# Patient Record
Sex: FEMALE | Race: WHITE | NOT HISPANIC OR LATINO
[De-identification: names, ages, dates, MRNs, and addresses within clinical notes are randomized per-mention and may not be internally consistent; named-entity substitution may affect disease eponyms.]

---

## 2017-01-30 ENCOUNTER — APPOINTMENT (OUTPATIENT)
Dept: PAIN MANAGEMENT | Facility: CLINIC | Age: 82
End: 2017-01-30

## 2017-01-30 VITALS
SYSTOLIC BLOOD PRESSURE: 163 MMHG | DIASTOLIC BLOOD PRESSURE: 83 MMHG | HEIGHT: 58 IN | BODY MASS INDEX: 24.56 KG/M2 | WEIGHT: 117 LBS | HEART RATE: 70 BPM

## 2017-02-22 ENCOUNTER — MESSAGE (OUTPATIENT)
Age: 82
End: 2017-02-22

## 2017-03-10 ENCOUNTER — MESSAGE (OUTPATIENT)
Age: 82
End: 2017-03-10

## 2017-03-15 ENCOUNTER — MESSAGE (OUTPATIENT)
Age: 82
End: 2017-03-15

## 2017-03-30 ENCOUNTER — APPOINTMENT (OUTPATIENT)
Dept: PAIN MANAGEMENT | Facility: CLINIC | Age: 82
End: 2017-03-30

## 2017-03-30 VITALS
BODY MASS INDEX: 24.56 KG/M2 | HEIGHT: 58 IN | WEIGHT: 117 LBS | DIASTOLIC BLOOD PRESSURE: 75 MMHG | HEART RATE: 90 BPM | SYSTOLIC BLOOD PRESSURE: 148 MMHG

## 2017-03-30 RX ORDER — METHYLPREDNISOLONE 4 MG/1
4 TABLET ORAL
Qty: 1 | Refills: 0 | Status: DISCONTINUED | COMMUNITY
Start: 2017-02-22 | End: 2017-03-30

## 2017-03-30 RX ORDER — DICLOFENAC SODIUM AND MISOPROSTOL 75; 200 MG/1; UG/1
75-0.2 TABLET, DELAYED RELEASE ORAL
Qty: 60 | Refills: 0 | Status: DISCONTINUED | COMMUNITY
Start: 2017-01-30 | End: 2017-03-30

## 2017-03-30 RX ORDER — NORTRIPTYLINE HYDROCHLORIDE 25 MG/1
25 CAPSULE ORAL
Qty: 60 | Refills: 0 | Status: DISCONTINUED | COMMUNITY
Start: 2017-01-30 | End: 2017-03-30

## 2017-04-02 ENCOUNTER — FORM ENCOUNTER (OUTPATIENT)
Age: 82
End: 2017-04-02

## 2017-04-03 ENCOUNTER — OUTPATIENT (OUTPATIENT)
Dept: OUTPATIENT SERVICES | Facility: HOSPITAL | Age: 82
LOS: 1 days | End: 2017-04-03
Payer: MEDICARE

## 2017-04-03 ENCOUNTER — APPOINTMENT (OUTPATIENT)
Dept: MRI IMAGING | Facility: CLINIC | Age: 82
End: 2017-04-03

## 2017-04-03 DIAGNOSIS — R51 HEADACHE: ICD-10-CM

## 2017-04-03 DIAGNOSIS — G44.85 PRIMARY STABBING HEADACHE: ICD-10-CM

## 2017-04-03 DIAGNOSIS — M50.90 CERVICAL DISC DISORDER, UNSPECIFIED, UNSPECIFIED CERVICAL REGION: ICD-10-CM

## 2017-04-03 PROCEDURE — 70551 MRI BRAIN STEM W/O DYE: CPT

## 2017-04-14 ENCOUNTER — MESSAGE (OUTPATIENT)
Age: 82
End: 2017-04-14

## 2017-05-22 ENCOUNTER — MESSAGE (OUTPATIENT)
Age: 82
End: 2017-05-22

## 2017-05-22 ENCOUNTER — FORM ENCOUNTER (OUTPATIENT)
Age: 82
End: 2017-05-22

## 2017-05-22 RX ORDER — BACLOFEN 10 MG/1
10 TABLET ORAL TWICE DAILY
Qty: 60 | Refills: 3 | Status: DISCONTINUED | COMMUNITY
Start: 2017-04-14 | End: 2017-05-22

## 2017-05-23 ENCOUNTER — OUTPATIENT (OUTPATIENT)
Dept: OUTPATIENT SERVICES | Facility: HOSPITAL | Age: 82
LOS: 1 days | End: 2017-05-23
Payer: MEDICARE

## 2017-05-23 ENCOUNTER — APPOINTMENT (OUTPATIENT)
Dept: MRI IMAGING | Facility: CLINIC | Age: 82
End: 2017-05-23

## 2017-05-23 DIAGNOSIS — M50.90 CERVICAL DISC DISORDER, UNSPECIFIED, UNSPECIFIED CERVICAL REGION: ICD-10-CM

## 2017-05-23 DIAGNOSIS — M62.838 OTHER MUSCLE SPASM: ICD-10-CM

## 2017-05-23 DIAGNOSIS — R51 HEADACHE: ICD-10-CM

## 2017-05-23 PROCEDURE — 72141 MRI NECK SPINE W/O DYE: CPT

## 2017-06-12 ENCOUNTER — MEDICATION RENEWAL (OUTPATIENT)
Age: 82
End: 2017-06-12

## 2017-06-21 ENCOUNTER — APPOINTMENT (OUTPATIENT)
Dept: NEUROSURGERY | Facility: CLINIC | Age: 82
End: 2017-06-21

## 2017-06-21 VITALS
HEART RATE: 66 BPM | WEIGHT: 118 LBS | DIASTOLIC BLOOD PRESSURE: 73 MMHG | SYSTOLIC BLOOD PRESSURE: 122 MMHG | BODY MASS INDEX: 24.77 KG/M2 | HEIGHT: 58 IN

## 2017-06-21 RX ORDER — INDOMETHACIN 50 MG/1
50 CAPSULE ORAL
Qty: 45 | Refills: 3 | Status: DISCONTINUED | COMMUNITY
Start: 2017-03-30 | End: 2017-06-21

## 2017-07-03 ENCOUNTER — APPOINTMENT (OUTPATIENT)
Dept: NEUROSURGERY | Facility: CLINIC | Age: 82
End: 2017-07-03

## 2017-07-03 ENCOUNTER — OUTPATIENT (OUTPATIENT)
Dept: OUTPATIENT SERVICES | Facility: HOSPITAL | Age: 82
LOS: 1 days | End: 2017-07-03
Payer: MEDICARE

## 2017-07-03 DIAGNOSIS — M54.12 RADICULOPATHY, CERVICAL REGION: ICD-10-CM

## 2017-07-03 PROCEDURE — 62321 NJX INTERLAMINAR CRV/THRC: CPT

## 2017-07-04 ENCOUNTER — TRANSCRIPTION ENCOUNTER (OUTPATIENT)
Age: 82
End: 2017-07-04

## 2017-07-18 ENCOUNTER — APPOINTMENT (OUTPATIENT)
Dept: NEUROSURGERY | Facility: CLINIC | Age: 82
End: 2017-07-18

## 2017-07-18 DIAGNOSIS — M62.838 OTHER MUSCLE SPASM: ICD-10-CM

## 2017-07-24 ENCOUNTER — MESSAGE (OUTPATIENT)
Age: 82
End: 2017-07-24

## 2017-08-17 ENCOUNTER — APPOINTMENT (OUTPATIENT)
Dept: PAIN MANAGEMENT | Facility: CLINIC | Age: 82
End: 2017-08-17

## 2017-10-17 ENCOUNTER — MESSAGE (OUTPATIENT)
Age: 82
End: 2017-10-17

## 2017-10-30 ENCOUNTER — APPOINTMENT (OUTPATIENT)
Dept: DERMATOLOGY | Facility: CLINIC | Age: 82
End: 2017-10-30

## 2018-01-17 ENCOUNTER — APPOINTMENT (OUTPATIENT)
Dept: PAIN MANAGEMENT | Facility: CLINIC | Age: 83
End: 2018-01-17
Payer: MEDICARE

## 2018-01-17 VITALS
HEART RATE: 77 BPM | DIASTOLIC BLOOD PRESSURE: 81 MMHG | WEIGHT: 124 LBS | BODY MASS INDEX: 26.03 KG/M2 | SYSTOLIC BLOOD PRESSURE: 144 MMHG | HEIGHT: 58 IN

## 2018-01-17 DIAGNOSIS — G44.85 PRIMARY STABBING HEADACHE: ICD-10-CM

## 2018-01-17 DIAGNOSIS — M54.2 CERVICALGIA: ICD-10-CM

## 2018-01-17 DIAGNOSIS — R51 HEADACHE: ICD-10-CM

## 2018-01-17 DIAGNOSIS — M50.90 CERVICAL DISC DISORDER, UNSPECIFIED, UNSPECIFIED CERVICAL REGION: ICD-10-CM

## 2018-01-17 PROCEDURE — 99214 OFFICE O/P EST MOD 30 MIN: CPT

## 2018-01-17 RX ORDER — MELOXICAM 15 MG/1
15 TABLET ORAL
Qty: 30 | Refills: 2 | Status: DISCONTINUED | COMMUNITY
Start: 2017-07-24 | End: 2018-01-17

## 2018-01-17 RX ORDER — DEXAMETHASONE 4 MG/1
4 TABLET ORAL
Qty: 20 | Refills: 0 | Status: DISCONTINUED | COMMUNITY
Start: 2017-10-17 | End: 2018-01-17

## 2019-01-14 ENCOUNTER — APPOINTMENT (OUTPATIENT)
Dept: NEUROLOGY | Facility: CLINIC | Age: 84
End: 2019-01-14

## 2019-03-18 ENCOUNTER — APPOINTMENT (OUTPATIENT)
Dept: NEUROLOGY | Facility: CLINIC | Age: 84
End: 2019-03-18
Payer: MEDICARE

## 2019-03-18 VITALS
DIASTOLIC BLOOD PRESSURE: 71 MMHG | SYSTOLIC BLOOD PRESSURE: 134 MMHG | HEART RATE: 72 BPM | BODY MASS INDEX: 24.54 KG/M2 | HEIGHT: 60 IN | WEIGHT: 125 LBS

## 2019-03-18 DIAGNOSIS — M79.2 NEURALGIA AND NEURITIS, UNSPECIFIED: ICD-10-CM

## 2019-03-18 DIAGNOSIS — K58.9 IRRITABLE BOWEL SYNDROME W/OUT DIARRHEA: ICD-10-CM

## 2019-03-18 PROCEDURE — 96116 NUBHVL XM PHYS/QHP 1ST HR: CPT | Mod: 59

## 2019-03-18 PROCEDURE — 99215 OFFICE O/P EST HI 40 MIN: CPT

## 2019-03-18 NOTE — HISTORY OF PRESENT ILLNESS
[FreeTextEntry1] : The patient is an 83 year old right handed [place] woman referred by Dr. Conway for memory loss\par \par She's had headaches since 2013 approx. bitemporal, pounding to occiput. +phonophobia, anorexia. Also neck stiffness and tension. Had 4 episodes that lasted weeks. Other HA type is unilateral for 30 min associated with warmth sensation in body. She used to follow with Dr. Adams in 2016, recommended amitriptyline which helped insomnia but not headache. She saw Dr. Conway Jan 2017. He recommended changing to nortriptyline and a course of arthrotec and PT. Didnt help so gave medrol dose pack but that only helped mildly. Baclofen also didn’t help. MRI c spine had mild-mod multilevel deg disk disease. He recommended epidural injections which didn’t help. Last f/u with him was Jan 2018 and he recommended coq10 and parafon forte (chlorzoxazone- muscle relaxant) and myofacial release. the medication didn’t help. she doesn’t take any medications regularly for pain. they say none of the medications she tried ever helped.\par \par Cognitive symptoms since approx 5 yrs 2014, worse in last 2-3 yrs. He says it is progressive. Forgets recent things. Repeats herself. Forgets something said even minutes prior. Doesnt always cue. No misplacing things. No trouble with appliances but never technically oriented. Sometimes wfd in english. she knows torah by heart still. Drives, no accidents or near accidents. got lost driving even in her 30s, never good sense of direction. He isnt worried about her driving skills.\par He says the daughters are very concerned. Youngest daughter is a PT at [employer]. \par \par She says maybe she has mild memory issues she thinks is in line with age. \par \par 3 days ago saw neurologist for burning sensation in R hand hand- Dr. Murphy at [place]. During that evaluation she had memory test with brief recall testing and didn’t perform well. EMG/NCS is pending. Started gabapentin and hasn’t helped yet.\par \par She has polycythemia vera and is on ruxolitinib (jakafi) for a few yrs. Was on hydroxyurea for yrs but stopped tolerating it, needed phlebotomy q 3 mos also. Since jakafi doesn’t need phlebotomy like she used to. He wondered if memory problem could be related to the medication but they deny correlation between symptoms and when the medication started.\par \par mood: says mood is good. lifelong worrier. never saw psychologist. no depression. denies nightmares. always thinks the worst.\par \par sleep: takes melatonin for sleep. sometimes snores but they sleep in separate rooms because of his snoring. energy is decent.  no unintentional naps.\par \par tends to stay home. watches tv. dose housework. ipad. talks to friends, is on facebook a lot. cooks and no decline in her cooking.

## 2019-03-18 NOTE — DATA REVIEWED
[de-identified] : \par mri brain 4/3/17 volume loss, microvascular ischemic disease\par i personally reviewed and see parietal and temporal atrophy b/l more than rest of the brain. mild microvascular ischemic disease, swan neg

## 2019-03-18 NOTE — PROCEDURE
[FreeTextEntry1] : EXTENDED NEUROBEHAVIORAL STATUS TESTING\par \par [This is a separate procedure note for the Neurobehavioral Status Examination that was performed during the encounter]. \par \par The patient was alert, well groomed, NAD. She was fluent with accented speech without paraphasic errors. Had some wfd in English in conversation, used Yakut words at times. Comprehension was intact. Appeared euthymic and reactive. She repeated same statements often, saying that her symptoms are just related to normal declines for age, that the testing isnt being done in her primary Yakut language. There was no psychomotor slowing.\par \par recent memory: had inaccurate autobiographical memory regarding mom's history per her 's account but she passionately described circumstances surrounding mother's death and  said he only knew what she had told him from yrs ago. can name children in birth order. can name grandchildren. can name [place] president.  couldn’t recall details of college bribe scandal despite details, he thought she would have heard, but when continued to talk about it she then recalled. made errors with recalling where she went to dinner yesterday, what food she had. knows purim is coming up. \par \par MoCA (original version 7.1) score out of 30: 14\par Visuospatial/Executive \par 	Trails: (-1) unable despite many prompts\par 	Cube: (-1) piecemeal attempt, unable\par 	Clock: (-1) put a line between 10 and 11, then wrote number 10 just past the 11. anchored the 12 and 6.\par Naming: (-1) couldn’t get rhino nor with phonemic cue\par Memory- registration: intact\par Attention \par 	Digit span 5F: intact\par 	Digit span 3R: intact\par 	Letter A test: intact\par 	Serial 7 subtraction: intact 4/5\par Language \par 	Phrase repetition: (-2)\par 	F word fluency- # words: (-1) 9\par Abstraction\par 	Train/bicycle: (-1) wheels- go place to place\par 	Watch/ruler: (-1) numbers\par Delayed recall score out of 5: (-5) 0\par 	Additional words recalled with category cue: 0\par 	Additional words recalled with multiple choice cue: 4 and 1 false positive\par Orientation: (-2) date off by 1, didn’t know city\par \par Additional neurobehavioral status tests:\par Animal naming fluency- # words: 4 \par intersecting pentagons: pentagon intersected with 4 sided figure, she tried to fix it to 5 sided figure but didn’t quite fix it properly, intersected properly\par Praxis\par       Ideomotor limb\par             Transitive\par 	    Brush teeth: intact b/l\par 	    Comb hair: intact b/l\par             Intransitive\par 	    Wave goodbye: intact b/l\par 	    Motion "come here": intact b/l\par       Meaningless gestures: intact to 3/4 (not crossed fingers)\par Right/Left orientation\par 	"Show me your right hand": correct \par 	"Show me your left hand": correct \par 	"With your right hand touch your left ear": correct\par 	"With your right hand, point to my left shoulder": incorrect\par 	"With your left hand, point to my left ear": correct\par \par INTERPRETATION: \par \par I carefully reviewed the above results of neurobehavioral status testing. The cognitive domains are listed below with my interpretation of whether or not there is an impairment or if performance was within normal limits. \par It should be noted that this testing is distinct from standard neuropsychological testing, which compares the patient's raw scores on different validated batteries of tests to those of their age-matched peers. Therefore subtle deficits may not be apparent on this testing, or instead some incorrect responses may overestimate deficits.\par \par Cognitive domains:\par 	Attention: within normal limits \par 	Working memory: within normal limits \par 	Executive function: dec executive control of figure copy, set shifting, abstraction\par 	Language: worse semantic than phonemic fluency; anomia in conversation but better with coming up with the Yakut word\par 	Memory: needed some cues in conversation for recent memory; on recall testing she had impaired spontaneous recall and then it mostly responded to cues but there was a false positive. she didn’t recall the memory testing. made repetitive statements during conversation.\par 	Visuospatial function: dec executive control\par 	Praxis: spared aside from1 meaningless gesture\par 	Behavior/Mood: appropriate/euthymic\par 	Other comments: r/l confusion on examiner once; impaired insight\par \par Please refer to the assessment section of this encounter that documents how to incorporate the interpretation of these results into the patient's diagnosis and plan of care.\par \par 35 min were taken to administer and interpret this extended neurobehavioral evaluation and prepare the report. \par \par Testing start time: 1:15p\par Testing stop time: 1:35p\par Report time: 15 min\par

## 2019-03-18 NOTE — PHYSICAL EXAM
[FreeTextEntry1] : General appearance: The patient is awake and alert, in no acute distress.\par Eyes: PERRL, moist conjunctiva, no scleral icterus.\par ENT: Oropharynx clear of any exudate or lesions. Dentition unremarkable. No lesions on lips or gums.\par Neck: supple, trachea midline. \par Pulmonary: Normal respiratory effort, no audible wheezing.\par Cardiac: Regular rate and rhythm. \par Vascular: No peripheral edema.\par Musculoskeletal: Gait and strength as noted in "Neurologic Examination" below. No clubbing or cyanosis. Normal range of motion.\par Skin: Warm and dry. No rashes or lesions. No bruising.\par Neurologic: See separate "Neurologic Examination" below.\par Psychiatric: Mood, affect and orientation as noted below in "Extended Neurobehavioral Examination".\par \par \par \par NEUROLOGIC EXAMINATION\par \par Cranial Nerves: \par visual fields full to confrontation\par pupils equal round and reactive to light\par extraocular motion intact without nystagmus but had trouble with smooth pursuit\par facial sensation intact symmetrically\par face symmetrical\par hearing intact to conversation bilaterally\par palate raises symmetrically, head turning and shoulder shrug symmetric, tongue midline without deviation with protrusion.\par No dysarthria.\par Motor: muscle tone mild inc b/l with enhancement\par            no pronator drift\par            fine finger movements symmetric and foot tapping symmetric\par            muscle strength normal in all 4 extremities and normal bulk in all 4 extremities\par Sensory exam: light touch was intact. Romberg's sign was negative\par Coordination: no tremor\par                       intact with finger to nose bilaterally\par                       balance was intact\par Gait: steady with a narrow base\par Deep tendon reflexes: 2+ at brachioradialis, biceps, triceps, and patellar bilaterally\par Primitive reflexes: No grasp reflex. No palmomental reflex. \par Cortical Sensory signs: No extinction to double simultaneous stimulation.\par \par \par \par Activities of Daily Living (Simon): independent vs. needs some help vs. unable/needs major assistance      \par                    indep                                  \par 1. Bathing/showering\par 2. Dressing\par 3. Toileting\par 4. Transferring\par 5. Continence\par 6. Feeding                                                                                                                                                           \par \par Instrumental Activities of Daily Living (Samaria-Ibn): independent vs. needs some help vs. unable/needs major assistance     \par             indep \par 1. Ability to use telephone\par 2. Shopping\par 3. Food preparation\par 4. Housekeeping\par 5. Laundry\par 6. Transportation (public/arranging rides/driving)\par 7. Responsibility for own medications\par 8. Ability to handle finances\par

## 2019-03-18 NOTE — ASSESSMENT
[FreeTextEntry1] : The patient is an 83 year old right handed woman with progressive cognitive decline since approx 2014 but especially since 2016 primarily affecting memory. No decline in IADLs. On neurobehavioral status testing she had impaired memory that mostly responded to cues but with a false positive. She was repetitive in conversation. Insight was impaired. There was decreased executive function. some anomia in english but not Irish but confounded by english being second language. Semantic fluency was worse then phonemic.\par \par Counseled that her history and the pattern on testing could be c/w an early stage of Alzheimer's disease causing mild cognitive impairment and not yet dementia. We discussed how her mri only showed mild microvascular ischemic disease so i don’t think there is any significant contribution from vascular disease, which she could have been at risk for given the polycythemia vera.  Would prefer full neuropsychological testing to ensure that English not being her primary language isnt confounding the assessment to make it appear worse than she really is, but I didn’t have the sense she would tolerate 3 hrs of testing and they agreed. Additionally, given how repetitive i witnessed her being, i don’t think performance on testing would reassure me enough to not want to pursue additional testing for AD anyway. Will see if her insurance covers FDG PET scan and if not will appeal. If denied could send for neuropsychological testing to get more comprehensive cognitive assessment and serve as baseline from which to follow.\par \par Counseled on lack of curative drugs in AD. Briefly discussed that could consider FDA approved medications in AD if pet is consistent, but will need to discuss more carefully since she does have IBS and aricept can cause GI upset. Namenda not proven to be helpful even in mild stages of dementia so would be premature to use in what may be MCI.\par \par No significant symptoms suggestive of JULIOCESAR contributing. She is a lifelong worrier which can be contributing but wouldn’t be able to fully explain her symptoms. Jakafi shouldn’t cause these symptoms and they didn’t coincide with its use so less likely to be related.

## 2019-03-20 ENCOUNTER — OTHER (OUTPATIENT)
Age: 84
End: 2019-03-20

## 2019-05-02 ENCOUNTER — OUTPATIENT (OUTPATIENT)
Dept: OUTPATIENT SERVICES | Facility: HOSPITAL | Age: 84
LOS: 1 days | End: 2019-05-02
Payer: MEDICARE

## 2019-05-02 ENCOUNTER — APPOINTMENT (OUTPATIENT)
Dept: NUCLEAR MEDICINE | Facility: IMAGING CENTER | Age: 84
End: 2019-05-02
Payer: MEDICARE

## 2019-05-02 DIAGNOSIS — R41.3 OTHER AMNESIA: ICD-10-CM

## 2019-05-02 PROCEDURE — 78608 BRAIN IMAGING (PET): CPT | Mod: 26

## 2019-05-02 PROCEDURE — A9552: CPT

## 2019-05-02 PROCEDURE — 78608 BRAIN IMAGING (PET): CPT

## 2019-05-03 ENCOUNTER — APPOINTMENT (OUTPATIENT)
Dept: PAIN MANAGEMENT | Facility: CLINIC | Age: 84
End: 2019-05-03
Payer: MEDICARE

## 2019-05-03 VITALS
SYSTOLIC BLOOD PRESSURE: 139 MMHG | DIASTOLIC BLOOD PRESSURE: 79 MMHG | BODY MASS INDEX: 24.54 KG/M2 | HEART RATE: 82 BPM | WEIGHT: 125 LBS | HEIGHT: 60 IN

## 2019-05-03 PROCEDURE — 99213 OFFICE O/P EST LOW 20 MIN: CPT

## 2019-05-06 NOTE — HISTORY OF PRESENT ILLNESS
[Headache] : headache [Nausea] : nausea [Daily] : daily [Neck Pain] : neck pain [FreeTextEntry1] : REturns today for a followup visit. \par For the past week pt has had a flare up of neck pain and headache . \par The pain and stiffness are worse on the left side. \par SHe denies any weakness in her arms. \par Recently had a flare up of lower back pain as well.\par Denies any recent trauma, fall or illnees. \par Has been taking OTC Aleve without much relief.

## 2019-05-06 NOTE — REVIEW OF SYSTEMS
[Neck Pain] : neck pain [Lower Back Pain] : lower back pain [Fever] : no fever [Chills] : no chills [Chest Pain] : no chest pain [Palpitations] : no palpitations [Shortness Of Breath] : no shortness of breath [Dizziness] : no dizziness [Fainting] : no fainting

## 2019-05-06 NOTE — ASSESSMENT
[FreeTextEntry1] : Neck pain and headache - trial of Nabumetone and indirect moist heat - pt cautioned to use care to prevent burn . \par Call office next week if no improvement.

## 2019-05-06 NOTE — PHYSICAL EXAM
[General Appearance - Alert] : alert [Oriented To Time, Place, And Person] : oriented to person, place, and time [General Appearance - Well-Appearing] : healthy appearing [Affect] : the affect was normal [Mood] : the mood was normal [Cranial Nerves Facial (VII)] : face symmetrical [Cranial Nerves Accessory (XI - Cranial And Spinal)] : head turning and shoulder shrug symmetric [Motor Strength] : muscle strength was normal in all four extremities [Cranial Nerves Hypoglossal (XII)] : there was no tongue deviation with protrusion [Involuntary Movements] : no involuntary movements were seen [2+] : Brachioradialis right 2+ [Sclera] : the sclera and conjunctiva were normal [PERRL With Normal Accommodation] : pupils were equal in size, round, reactive to light, with normal accommodation [] : no respiratory distress [Extraocular Movements] : extraocular movements were intact [Respiration, Rhythm And Depth] : normal respiratory rhythm and effort [Heart Rate And Rhythm] : heart rate was normal and rhythm regular [Edema] : there was no peripheral edema [Abnormal Walk] : normal gait [Paresis Pronator Drift Left-Sided] : no pronator drift on the left [Paresis Pronator Drift Right-Sided] : no pronator drift on the right [Motor Strength Upper Extremities Bilaterally] : strength was normal in both upper extremities [Motor Strength Lower Extremities Bilaterally] : strength was normal in both lower extremities [Hand Weakness Right] : normal hand  [FreeTextEntry6] : decreased ROM when turning head side to side. due to pain  [Hand Weakness Left] : normal hand

## 2019-05-29 ENCOUNTER — TRANSCRIPTION ENCOUNTER (OUTPATIENT)
Age: 84
End: 2019-05-29

## 2019-06-19 ENCOUNTER — APPOINTMENT (OUTPATIENT)
Dept: NEUROLOGY | Facility: CLINIC | Age: 84
End: 2019-06-19
Payer: MEDICARE

## 2019-06-19 VITALS
HEIGHT: 60 IN | DIASTOLIC BLOOD PRESSURE: 75 MMHG | BODY MASS INDEX: 24.54 KG/M2 | HEART RATE: 76 BPM | SYSTOLIC BLOOD PRESSURE: 134 MMHG | WEIGHT: 125 LBS

## 2019-06-19 PROCEDURE — 99215 OFFICE O/P EST HI 40 MIN: CPT

## 2019-06-19 NOTE — DATA REVIEWED
[de-identified] : \par mri brain 4/3/17 volume loss, microvascular ischemic disease\par i personally reviewed and see parietal and temporal atrophy b/l more than rest of the brain. mild microvascular ischemic disease, swan neg [de-identified] : \par Neurobehavioral status testing 3/18/19\par Cognitive domains:\par 	Attention: within normal limits \par 	Working memory: within normal limits \par 	Executive function: dec executive control of figure copy, set shifting, abstraction\par 	Language: worse semantic than phonemic fluency; anomia in conversation but better with coming up with the Sami word\par 	Memory: needed some cues in conversation for recent memory; on recall testing she had impaired spontaneous recall and then it mostly responded to cues but there was a false positive. she didn’t recall the memory testing. made repetitive statements during conversation.\par 	Visuospatial function: dec executive control\par 	Praxis: spared aside from1 meaningless gesture\par 	Behavior/Mood: appropriate/euthymic\par 	Other comments: r/l confusion on examiner once; impaired insight\par                     moca 14/30 [de-identified] : \par fdg pet 5/2/19 marginally dec activity b/l inferobasal temporal lobes and anteromedial frontal cortex suggesting early FTD\par i personally reviewed and agree. I had network analysis performed and i was told there was slight dec anteromedial frontal and temporal c/w early stage FTD, and no metabolic dec in P-O regions to suggest AD and that quantitative analysis supported FTD.

## 2019-06-19 NOTE — HISTORY OF PRESENT ILLNESS
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 We re-reviewed sx though and none c/w bvFTD.\par \par Her  says personality is outgoing, friendly. No rude or inappropriate comments. No change in perosnality.\par The PV med jakafi has increased her appetite but no change in cravings- always liked chocolate. No rigidness. \par No loss of empathy or personal warmth. \par She says she likes staying home more than before. Says it is due to lack of energy and desire. He says she doesn’t like to be alone, but that she never liked to be alone. Shes active with friends and family on facebook. \par No change in political views.\par At times shell forget the name of a food item- ex: meatball vs hamburger. But language issues are confounded by French as her primary language.\par  thinks daughters are overly concerned. \par \par She has chronic worry. Catastrophizes. If someone comes home late for example she thinks they are dead. Says she was always like this and it is related to how she was brought up in [place].\par \par \par ------------------------------\par Background information (initial visit 3/18/19):\par \par  Patient accompanied by  Jon. \par \par The patient is an 83 year old right handed Chinese woman referred by Dr. Conway for memory loss\par \par She's had headaches since 2013 approx. bitemporal, pounding to occiput. +phonophobia, anorexia. Also neck stiffness and tension. Had 4 episodes that lasted weeks. Other HA type is unilateral for 30 min associated with warmth sensation in body. She used to follow with Dr. Adams in 2016, recommended amitriptyline which helped insomnia but not headache. She saw Dr. Conway Jan 2017. He recommended changing to nortriptyline and a course of arthrotec and PT. Didnt help so gave medrol dose pack but that only helped mildly. Baclofen also didn’t help. MRI c spine had mild-mod multilevel deg disk disease. He recommended epidural injections which didn’t help. Last f/u with him was Jan 2018 and he recommended coq10 and parafon forte (chlorzoxazone- muscle relaxant) and myofacial release. the medication didn’t help. she doesn’t take any medications regularly for pain. they say none of the medications she tried ever helped.\par \par Cognitive symptoms since approx 5 yrs 2014, worse in last 2-3 yrs. He says it is progressive. Forgets recent things. Repeats herself. Forgets something said even minutes prior. Doesnt always cue. No misplacing things. No trouble with appliances but never technically oriented. Sometimes wfd in english. she knows torah by heart still. Drives, no accidents or near accidents. got lost driving even in her 30s, never good sense of direction. He isnt worried about her driving skills.\par He says the daughters are very concerned. Youngest daughter is a PT at [employer]. \par \par She says maybe she has mild memory issues she thinks is in line with age. \par \par 3 days ago saw neurologist for burning sensation in R hand hand- Dr. Murphy at [place]. During that evaluation she had memory test with brief recall testing and didn’t perform well. EMG/NCS is pending. Started gabapentin and hasn’t helped yet.\par \par She has polycythemia vera and is on ruxolitinib (jakafi) for a few yrs. Was on hydroxyurea for yrs but stopped tolerating it, needed phlebotomy q 3 mos also. Since jakafi doesn’t need phlebotomy like she used to. He wondered if memory problem could be related to the medication but they deny correlation between symptoms and when the medication started.\par \par mood: says mood is good. lifelong worrier. never saw psychologist. no depression. denies nightmares. always thinks the worst.\par \par sleep: takes melatonin for sleep. sometimes snores but they sleep in separate rooms because of his snoring. energy is decent.  no unintentional naps.\par \par tends to stay home. watches tv. dose housework. ipad. talks to friends, is on facebook a lot. cooks and no decline in her cooking.

## 2019-06-19 NOTE — PHYSICAL EXAM
[FreeTextEntry1] : \par Awake and alert, in no acute distress\par Attends to both sides. Conjugate gaze without directional limitation. No dysarthria. Face symmetric\par Moves extremities symmetrically\par prior: muscle tone mild inc b/l with enhancement\par No dysmetria with reaching for objects\par Gait steady\par  \par \par The patient was alert, well groomed, NAD. She was fluent with accented speech without paraphasic errors. Had some wfd in English in conversation, used Upper sorbian words at times. Comprehension was intact. Appeared euthymic and reactive but mentioned her usual anxious thoughts that were in line with generalized anxiety disorder and catastrophic thought distortions. She repeated same statements often, saying that her symptoms are just related to normal declines for age. There was no psychomotor slowing. \par \par famous faces test: she could recognize fernandes and jfk well, harder for the actors but appeared to recognize them, but anomic to names\par \par [place] semantic test: able to do 7 well, 2 others struggle that seemed more related to not seeing the picture well- wasn’t wearing glasses- example though ironing table was folding table. didn’t recognize spiderweb but could describe semantics once it was translated into Slovak.  says she wouldn’t know a football and she referenced it as a ball \par

## 2019-06-19 NOTE — ASSESSMENT
[FreeTextEntry1] : The patient is an 83 year old right handed woman with progressive cognitive decline since approx 2014 but especially since 2016 primarily affecting memory. No decline in IADLs. On neurobehavioral status testing she had impaired memory that mostly responded to cues but with a false positive. She was repetitive in conversation. Insight was impaired. There was decreased executive function. some anomia in english but not Maori but confounded by english being second language. Semantic fluency was worse then phonemic.\par \par Counseled that her history and the pattern on testing could be c/w an early stage of Alzheimer's disease causing mild cognitive impairment and not yet dementia. We discussed how her mri only showed mild microvascular ischemic disease so i don’t think there is any significant contribution from vascular disease, which she could have been at risk for given the polycythemia vera.  Would prefer full neuropsychological testing to ensure that English not being her primary language isnt confounding the assessment to make it appear worse than she really is, but I didn’t have the sense she would tolerate 3 hrs of testing and they agreed. Additionally, given how repetitive i witnessed her being, i don’t think performance on testing would reassure me enough to not want to pursue additional testing for AD anyway. Surprisingly FTD PET scan showed very mild hypometabolism in the anteromedial frontal regions and inferobasal temporal regions that are in an FTD pattern and not AD pattern, confirmed with network analysis. However she doesn’t have any clinical symptoms c/w behavioral variant FTD. The pattern wasn’t isolated to anterior temporal region like would see in svPPA and although she has anomia i don’t think there is semantic loss so that also wouldn’t be fitting. In addition, FTD is typically younger age of onset. \par \par Symptoms still seem more c/w AD than FTD but neurodegenerative pattern is in FTD pattern. Counseled on lack of curative drugs in FTD and lack of any FDA approved medications in FTD. We discussed the FDA approved medications in AD and how want to avoid aricept since it can worsen behavioral changes in FTD. In addition she has IBS and aricept can cause GI upset. Namenda not indicated for FTD and even in AD not proven to be helpful even in mild stages of disease, I don’t recommend.\par \par No significant symptoms suggestive of JULIOCESAR contributing. She is a lifelong worrier which can be contributing but wouldn’t be able to fully explain her symptoms. 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  Counseled on strategies for maintaining cognitive health.

## 2019-12-19 ENCOUNTER — APPOINTMENT (OUTPATIENT)
Dept: NEUROLOGY | Facility: CLINIC | Age: 84
End: 2019-12-19
Payer: MEDICARE

## 2019-12-19 VITALS
HEIGHT: 60 IN | SYSTOLIC BLOOD PRESSURE: 132 MMHG | DIASTOLIC BLOOD PRESSURE: 70 MMHG | HEART RATE: 80 BPM | WEIGHT: 130 LBS | BODY MASS INDEX: 25.52 KG/M2

## 2019-12-19 PROCEDURE — 99215 OFFICE O/P EST HI 40 MIN: CPT

## 2019-12-19 RX ORDER — NABUMETONE 750 MG/1
750 TABLET, FILM COATED ORAL
Qty: 14 | Refills: 0 | Status: COMPLETED | COMMUNITY
Start: 2019-05-03 | End: 2019-12-19

## 2019-12-19 RX ORDER — GABAPENTIN 100 MG
100 TABLET ORAL 3 TIMES DAILY
Refills: 0 | Status: COMPLETED | COMMUNITY
End: 2019-12-19

## 2019-12-19 NOTE — HISTORY OF PRESENT ILLNESS
[FreeTextEntry1] : Interval hx:\par 12/19/19 visit:\par In June i d/w her daughter Brit my impression by phone.\par In sept i spoke with her  by phone. he reported rapid forgetting of things within a few min, repetitive questions. no insight. we reviewed these being characteristic of neurodegenerative diseases and specifically still AD sounded more likely despite the pet suggesting FTD.  she referenced something from 40 yrs ago as if happened last yr but didn't recall the argument the next day and he didn't bring it up, we discussed that was a good strategy.\par \par daughter wasn’t available today to be on speakerphone like we had planned.\par she was fine on a trip to [facility].\par \par taking benadryl for the last year for sleep, they hadnt mentioned this before, oncologist recommended it. also takes melatonin. i rec she stop benadryl.\par doesn’t wake up during the night. no snoring. normal energy.\par \par memory has worsened. anxiety elevated but at her baseline. she doesn’t want to see a therapist. repetitive with questions.\par she doesn’t like to be left alone as much. hasn’t driven for a few months, only once and he felt safe with her driving. she doesn’t want to drive.\par shes good with her meds. she pays the bills. stage still seems more like MCI.\par \par \par ------------------------------\par Background information (initial visit 3/18/19):\par \par  Patient accompanied by  Jon. \par \par The patient is an 83 year old right handed Iraqi woman referred by Dr. Conway for memory loss\par \par She's had headaches since 2013 approx. bitemporal, pounding to occiput. +phonophobia, anorexia. Also neck stiffness and tension. Had 4 episodes that lasted weeks. Other HA type is unilateral for 30 min associated with warmth sensation in body. She used to follow with Dr. Adams in 2016, recommended amitriptyline which helped insomnia but not headache. She saw Dr. Conway Jan 2017. He recommended changing to nortriptyline and a course of arthrotec and PT. Didnt help so gave medrol dose pack but that only helped mildly. Baclofen also didn’t help. MRI c spine had mild-mod multilevel deg disk disease. He recommended epidural injections which didn’t help. Last f/u with him was Jan 2018 and he recommended coq10 and parafon forte (chlorzoxazone- muscle relaxant) and myofacial release. the medication didn’t help. she doesn’t take any medications regularly for pain. they say none of the medications she tried ever helped.\par \par Cognitive symptoms since approx 5 yrs 2014, worse in last 2-3 yrs. He says it is progressive. Forgets recent things. Repeats herself. Forgets something said even minutes prior. Doesnt always cue. No misplacing things. No trouble with appliances but never technically oriented. Sometimes wfd in english. she knows torah by heart still. Drives, no accidents or near accidents. got lost driving even in her 30s, never good sense of direction. He isnt worried about her driving skills.\par He says the daughters are very concerned. Youngest daughter is a PT at [employer]. \par \par She says maybe she has mild memory issues she thinks is in line with age. \par \par 3 days ago saw neurologist for burning sensation in R hand hand- Dr. Murphy at [place]. During that evaluation she had memory test with brief recall testing and didn’t perform well. EMG/NCS is pending. Started gabapentin and hasn’t helped yet.\par \par She has polycythemia vera and is on ruxolitinib (jakafi) for a few yrs. Was on hydroxyurea for yrs but stopped tolerating it, needed phlebotomy q 3 mos also. Since jakafi doesn’t need phlebotomy like she used to. He wondered if memory problem could be related to the medication but they deny correlation between symptoms and when the medication started.\par \par mood: says mood is good. lifelong worrier. never saw psychologist. no depression. denies nightmares. always thinks the worst.\par \par sleep: takes melatonin for sleep. sometimes snores but they sleep in separate rooms because of his snoring. energy is decent.  no unintentional naps.\par \par tends to stay home. watches tv. dose housework. ipad. talks to friends, is on facebook a lot. cooks and no decline in her cooking. \par \par 06/19/2019 visit: Patient accompanied by  Jon. 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 We re-reviewed sx though and none c/w bvFTD.\par \par Her  says personality is outgoing, friendly. No rude or inappropriate comments. No change in perosnality.\par The PV med jakafi has increased her appetite but no change in cravings- always liked chocolate. No rigidness. \par No loss of empathy or personal warmth. \par She says she likes staying home more than before. Says it is due to lack of energy and desire. He says she doesn’t like to be alone, but that she never liked to be alone. Shes active with friends and family on facebook. \par No change in political views.\par At times shell forget the name of a food item- ex: meatball vs hamburger. But language issues are confounded by Bengali as her primary language.\par  thinks daughters are overly concerned. \par \par She has chronic worry. Catastrophizes. If someone comes home late for example she thinks they are dead. Says she was always like this and it is related to how she was brought up in [place].

## 2019-12-19 NOTE — PHYSICAL EXAM
[FreeTextEntry1] : \par Awake and alert, in no acute distress\par Attends to both sides. Conjugate gaze without directional limitation. No dysarthria. Face symmetric\par Moves extremities symmetrically\par prior: muscle tone mild inc b/l with enhancement\par No dysmetria with reaching for objects\par Gait steady\par  \par \par The patient was alert, well groomed, NAD. She was fluent with accented speech without paraphasic errors. Had some wfd in English in conversation, used Georgian words at times. Comprehension was intact. Appeared euthymic and reactive but mentioned her usual anxious thoughts that were in line with generalized anxiety disorder and catastrophic thought distortions. She repeated statements a few times, for example saying that her symptoms are just related to normal declines for age, saying that her worries are all normal for a mother. There was no psychomotor slowing. \par \par regularly looked to  to answer questions.\par knows bernard is coming up. couldn’t recall last holiday or what she did on thanksging. couldn’t recall what she idd for her birthday. stated a few times she didn’t know why she was seeing me today.  could name the president. mentioned how she likes to read about [place] news, couldn’t state any trump policies related to lamine.\par \par iadls re-reviewed\par Activities of Daily Living (Simon): independent vs. needs some help vs. unable/needs major assistance.      \par            --responses were the following: except where noted,       indep                                    \par 1. Bathing/showering\par 2. Dressing\par 3. Toileting\par 4. Transferring\par 5. Continence\par 6. Feeding                                                                                                                                                           \par \par Instrumental Activities of Daily Living (Berger-Bin): independent vs. needs some help vs. unable/needs major assistance.     \par            --responses were the following: except where noted, indep\par 1. Ability to use telephone\par 2. Shopping\par 3. Food preparation\par 4. Housekeeping\par 5. Laundry\par 6. Transportation (public/arranging rides/driving)- some help (she doesn’t like to drive anymore)\par 7. Responsibility for own medications\par 8. Ability to handle finances

## 2019-12-19 NOTE — ASSESSMENT
[FreeTextEntry1] : The patient is an 84 year old right handed woman with progressive cognitive decline since approx 2014 but especially since 2016 primarily affecting memory. No decline in IADLs. On neurobehavioral status testing she had impaired memory that mostly responded to cues but with a false positive. She was repetitive in conversation. Insight was impaired. There was decreased executive function. some anomia in english but not French but confounded by english being second language. Semantic fluency was worse then phonemic.\par \par Counseled that her history and the pattern on testing seem c/w an early stage of Alzheimer's disease causing mild cognitive impairment, borderline with mild dementia. We discussed how her mri only showed mild microvascular ischemic disease so i don’t think there is any significant contribution from vascular disease, which she could have been at risk for given the polycythemia vera.  Surprisingly FDG PET scan showed very mild hypometabolism in the anteromedial frontal regions and inferobasal temporal regions that are in an FTD pattern and not AD pattern, confirmed with network analysis. However she doesn’t have any clinical symptoms c/w behavioral variant FTD. The pattern wasn’t isolated to anterior temporal region like would see in svPPA and although she has anomia i don’t think there is semantic loss so that also wouldn’t be fitting. In addition, FTD is typically younger age of onset. \par \par Symptoms still seem more c/w AD than FTD but neurodegenerative pattern is in FTD pattern. Counseled on lack of curative drugs in FTD and lack of any FDA approved medications in FTD. We discussed the FDA approved medications in AD and how in FTD want to avoid aricept since it can worsen behavioral changes. I would like to do aricept trial since AD seems most likely, though with caution given that she IBS and aricept can cause GI upset. Namenda not indicated for FTD and even in AD not proven to be helpful even in mild stages of disease, I don’t recommend.  Since approx 2018 shes been on benadryl for sleep, that could be worsening her memory, she will stop.\par \par No significant symptoms suggestive of JULIOCESAR contributing. She is a lifelong worrier which can be contributing but wouldn’t be able to fully explain her symptoms. Jakafi shouldn’t cause these symptoms and they didn’t coincide with its use so less likely to be related.\par \par   Counseled on strategies for maintaining cognitive health. Counseled on safety concerns.

## 2019-12-19 NOTE — DATA REVIEWED
[de-identified] : \par mri brain 4/3/17 volume loss, microvascular ischemic disease\par i personally reviewed and see parietal and temporal atrophy b/l more than rest of the brain. mild microvascular ischemic disease, swan neg [de-identified] : \par Neurobehavioral status testing 3/18/19\par Cognitive domains:\par 	Attention: within normal limits \par 	Working memory: within normal limits \par 	Executive function: dec executive control of figure copy, set shifting, abstraction\par 	Language: worse semantic than phonemic fluency; anomia in conversation but better with coming up with the Divehi word\par 	Memory: needed some cues in conversation for recent memory; on recall testing she had impaired spontaneous recall and then it mostly responded to cues but there was a false positive. she didn’t recall the memory testing. made repetitive statements during conversation.\par 	Visuospatial function: dec executive control\par 	Praxis: spared aside from1 meaningless gesture\par 	Behavior/Mood: appropriate/euthymic\par 	Other comments: r/l confusion on examiner once; impaired insight\par                     moca 14/30 [de-identified] : \par fdg pet 5/2/19 marginally dec activity b/l inferobasal temporal lobes and anteromedial frontal cortex suggesting early FTD\par i personally reviewed and agree. I had network analysis performed and i was told there was slight dec anteromedial frontal and temporal c/w early stage FTD, and no metabolic dec in P-O regions to suggest AD and that quantitative analysis supported FTD.

## 2020-03-23 ENCOUNTER — APPOINTMENT (OUTPATIENT)
Dept: NEUROLOGY | Facility: CLINIC | Age: 85
End: 2020-03-23

## 2020-11-23 ENCOUNTER — APPOINTMENT (OUTPATIENT)
Dept: NEUROLOGY | Facility: CLINIC | Age: 85
End: 2020-11-23
Payer: MEDICARE

## 2020-11-23 VITALS
HEART RATE: 84 BPM | BODY MASS INDEX: 25.13 KG/M2 | DIASTOLIC BLOOD PRESSURE: 82 MMHG | SYSTOLIC BLOOD PRESSURE: 124 MMHG | HEIGHT: 60 IN | WEIGHT: 128 LBS

## 2020-11-23 VITALS — TEMPERATURE: 97.5 F

## 2020-11-23 PROCEDURE — 99215 OFFICE O/P EST HI 40 MIN: CPT

## 2021-03-23 NOTE — END OF VISIT
[>50% of Time Spent on Counseling for ____] : Greater than 50% of the encounter time was spent on counseling for [unfilled] [Time Spent: ___ minutes] : I have spent [unfilled] minutes of face to face time with the patient Opt out

## 2021-05-24 ENCOUNTER — APPOINTMENT (OUTPATIENT)
Dept: NEUROLOGY | Facility: CLINIC | Age: 86
End: 2021-05-24

## 2021-10-12 ENCOUNTER — APPOINTMENT (OUTPATIENT)
Dept: NEUROLOGY | Facility: CLINIC | Age: 86
End: 2021-10-12
Payer: MEDICARE

## 2021-10-12 VITALS
DIASTOLIC BLOOD PRESSURE: 74 MMHG | BODY MASS INDEX: 23.56 KG/M2 | HEIGHT: 60 IN | WEIGHT: 120 LBS | SYSTOLIC BLOOD PRESSURE: 129 MMHG | HEART RATE: 79 BPM

## 2021-10-12 PROCEDURE — 99215 OFFICE O/P EST HI 40 MIN: CPT

## 2022-02-03 ENCOUNTER — NON-APPOINTMENT (OUTPATIENT)
Age: 87
End: 2022-02-03

## 2022-02-03 DIAGNOSIS — R48.3 VISUAL AGNOSIA: ICD-10-CM

## 2022-03-14 ENCOUNTER — APPOINTMENT (OUTPATIENT)
Dept: NEUROLOGY | Facility: CLINIC | Age: 87
End: 2022-03-14
Payer: MEDICARE

## 2022-03-14 VITALS
WEIGHT: 115 LBS | BODY MASS INDEX: 22.58 KG/M2 | HEIGHT: 60 IN | DIASTOLIC BLOOD PRESSURE: 71 MMHG | SYSTOLIC BLOOD PRESSURE: 115 MMHG | HEART RATE: 72 BPM

## 2022-03-14 DIAGNOSIS — R45.3 DEMORALIZATION AND APATHY: ICD-10-CM

## 2022-03-14 PROCEDURE — 99215 OFFICE O/P EST HI 40 MIN: CPT

## 2022-03-29 ENCOUNTER — APPOINTMENT (OUTPATIENT)
Dept: GERIATRICS | Facility: CLINIC | Age: 87
End: 2022-03-29
Payer: MEDICARE

## 2022-03-29 ENCOUNTER — NON-APPOINTMENT (OUTPATIENT)
Age: 87
End: 2022-03-29

## 2022-03-29 VITALS
OXYGEN SATURATION: 98 % | BODY MASS INDEX: 23.36 KG/M2 | HEART RATE: 70 BPM | WEIGHT: 119 LBS | DIASTOLIC BLOOD PRESSURE: 60 MMHG | RESPIRATION RATE: 18 BRPM | SYSTOLIC BLOOD PRESSURE: 116 MMHG | TEMPERATURE: 97.5 F | HEIGHT: 60 IN

## 2022-03-29 DIAGNOSIS — R26.81 UNSTEADINESS ON FEET: ICD-10-CM

## 2022-03-29 DIAGNOSIS — S82.92XA UNSPECIFIED FRACTURE OF LEFT LOWER LEG, INITIAL ENCOUNTER FOR CLOSED FRACTURE: ICD-10-CM

## 2022-03-29 DIAGNOSIS — F03.90 UNSPECIFIED DEMENTIA W/OUT BEHAVIORAL DISTURBANCE: ICD-10-CM

## 2022-03-29 DIAGNOSIS — Z87.891 PERSONAL HISTORY OF NICOTINE DEPENDENCE: ICD-10-CM

## 2022-03-29 PROCEDURE — G0444 DEPRESSION SCREEN ANNUAL: CPT | Mod: 59

## 2022-03-29 PROCEDURE — 99205 OFFICE O/P NEW HI 60 MIN: CPT | Mod: 25

## 2022-03-29 RX ORDER — MULTIVIT-MIN/IRON/FOLIC ACID/K 18-600-40
CAPSULE ORAL
Refills: 0 | Status: DISCONTINUED | COMMUNITY
End: 2022-03-29

## 2022-03-29 RX ORDER — UBIDECARENONE 200 MG
CAPSULE ORAL
Refills: 0 | Status: DISCONTINUED | COMMUNITY
End: 2022-03-29

## 2022-03-29 RX ORDER — MAGNESIUM OXIDE/MAG AA CHELATE 300 MG
CAPSULE ORAL
Refills: 0 | Status: DISCONTINUED | COMMUNITY
End: 2022-03-29

## 2022-03-29 RX ORDER — PSYLLIUM HUSK 0.4 G
CAPSULE ORAL
Refills: 0 | Status: DISCONTINUED | COMMUNITY
End: 2022-03-29

## 2022-03-29 RX ORDER — VITAMIN E ACID SUCCINATE 268 MG
TABLET ORAL
Refills: 0 | Status: DISCONTINUED | COMMUNITY
End: 2022-03-29

## 2022-05-19 PROBLEM — R26.81 GAIT INSTABILITY: Status: ACTIVE | Noted: 2022-05-19

## 2022-05-19 PROBLEM — F03.90 NEURODEGENERATIVE DEMENTIA: Status: ACTIVE | Noted: 2019-03-18

## 2022-05-19 NOTE — HISTORY OF PRESENT ILLNESS
[No falls in past year] : Patient reported no falls in the past year [Patient is independent with] : bathing [Independent] : transferring/mobility [Full assistance needed] : Assistance needed managing medications [] : Assistance needed managing finances. [1] : 2) Feeling down, depressed, or hopeless for several days (1) [PHQ-2 Positive] : PHQ-2 Positive [FreeTextEntry1] : \par Pt denies having any medical problems except memory loss. \par \par Traveled to [place] in 2021 - was so confused had to come back early. \par \par Referred by neuro Dr. Duenas for ADC program. \par \par COGNITIVE CHANGES / [x ] MEMORY LOSS\par - Initial symptoms / Progression/ Dx: First memory changes noted approx 2019 - before pandemic - slow progression since then. Jon works from home as  since pandemic - has been able to help care for wife at home past couple years. \par \par - Appetite: good\par - Motor Syx: denies, no h/o falls \par \par - Sleep: urinates approx 1x/night \par \par - Mood: generally happy person, in past 1-2 yrs developed episodes of anger - very easily - but forgets easily about it, doesn't last overnight. \par \par - Behavior: paranoid about door being unlocked and stove being on - checks several times \par \par - Hallucinations [ ] \par - Delusions [x ] Sometimes when wakes up in AM has trouble recognizing where she is, few times thought that her  was her father \par \par - Previous cognitive testing: 10/12/21 mmse 20/30 per Dr. Duenas\par \par [x ] Neuro Dr. Duenas - visit note appreciated in EMR - imp: neurodeg dementia, ?AD;  FDG PET s/o FTD pattern and not AD pattern, no significant vascular contribution\par [ ] Psych\par \par - Previously on focus factor - stopped\par \par POLYCYTHEMIA VERA\par - was Tx with HYdroxyurea -wasn't tolerating well \par - heme: Dr. Richie George\par - On Jakifi \par \par \par - follows w/ cardiologist for routine checkups - denies heart problems: Dr. Lucy Bui [phone]; fax [phone]\par \par PMD: Dr. Mau Escobedo \par  [PapSmeardate] : >5 yrs ago  [TextBox_31] : has been few years since hearing checked  [BoneDensityDate] : 2021 [TextBox_37] : s/p cataract sx, last ophtho, wears reading glasses  [Driving Concerns] : not driving or driving without noted concerns [facility]] : >12  [de-identified] : sometimes needs help figuring out the phone  [de-identified] : sometimes cooks too much food [de-identified] :  does  [FreeTextEntry6] : stopped driving in 2018 b/c  was worried about her driving [FreeTextEntry7] :  manages and reminds  [de-identified] :  took over approx 2019  [de-identified] : stopped driving in 2018 b/c  was worried about her driving  [de-identified] : PHQ2 of 3/29/22  [[id number]] : 2 [AdvancecareDate] : 3/29/22 [FreeTextEntry4] : HCP form not on file -  states he's primary, and dtr Doreen is alternate \par MOLST not on file

## 2022-05-19 NOTE — ASSESSMENT
[FreeTextEntry1] : - Education, counseling, and support provided\par - Dementia diagnosis and progression/prognosis discussed \par - Medications reviewed with CG and reconciled\par - Adv regularly stimulating activity - including cognitive and physical, and regular socialization\par - Adv to consider day program\par - PT? - will consider referral\par \par - ADC program discussed and reading material about program provided. f/u with AVELINO Reynoso as soon as able advised \par - Referred to  for additional counseling, education, support, resources\par \par cog/mood eval at next visit \par \par Rest as per PMD\par \par f/u with me PRN\par

## 2022-05-19 NOTE — PHYSICAL EXAM
[Normal] : normal bowel sounds, non-tender [No Focal Deficits] : no focal deficits [Normal Affect] : the affect was normal [Normal Mood] : the mood was normal [Normal Gait] : abnormal gait [Normal Insight/Judgment] : insight and judgment were not intact

## 2022-05-19 NOTE — REASON FOR VISIT
[Initial Evaluation] : an initial evaluation [FreeTextEntry1] : dementia, ADC program [FreeTextEntry3] :  Jon  [FreeTextEntry2] : who assists with hsitory d/t pt with baseline memory loss

## 2022-05-25 ENCOUNTER — APPOINTMENT (OUTPATIENT)
Dept: OTOLARYNGOLOGY | Facility: CLINIC | Age: 87
End: 2022-05-25
Payer: MEDICARE

## 2022-05-25 VITALS — DIASTOLIC BLOOD PRESSURE: 70 MMHG | TEMPERATURE: 97.7 F | SYSTOLIC BLOOD PRESSURE: 131 MMHG | HEART RATE: 75 BPM

## 2022-05-25 DIAGNOSIS — H90.3 SENSORINEURAL HEARING LOSS, BILATERAL: ICD-10-CM

## 2022-05-25 DIAGNOSIS — H61.23 IMPACTED CERUMEN, BILATERAL: ICD-10-CM

## 2022-05-25 PROCEDURE — 99203 OFFICE O/P NEW LOW 30 MIN: CPT | Mod: 25

## 2022-05-25 PROCEDURE — 92557 COMPREHENSIVE HEARING TEST: CPT

## 2022-05-25 PROCEDURE — 92567 TYMPANOMETRY: CPT

## 2022-05-25 PROCEDURE — G0268 REMOVAL OF IMPACTED WAX MD: CPT

## 2022-05-25 NOTE — HISTORY OF PRESENT ILLNESS
[de-identified] : 87 yo female\par Patient with hx of Dementia here with her . She was referred by her  for hearing evaluation. She tends zone out of conversations. Her  does not notice that she has a hearing but he is also not sure. Pt has no ear pain, ear drainage, tinnitus, vertigo, nasal congestion, nasal discharge, epistaxis, sinus infections, facial pain, facial pressure, throat pain, dysphagia or fevers\par \par  [Hearing Loss] : hearing loss [Presbycusis] : presbycusis [Early Onset Hearing Loss] : no early onset hearing loss [Stroke] : no stroke [Allergic Rhinitis] : no allergic rhinitis [Adenoidectomy] : no adenoidectomy [Allergies] : no allergies [Asthma] : no asthma [None] : The patient is currently asymptomatic.

## 2022-05-25 NOTE — ASSESSMENT
[FreeTextEntry1] : Ms. JONES 86 year F with hx of frontal lobe dementia here with her  for hearing evaluation.\par \par Wax\par -Cerumen is removed from the right and left  ear canal with a curette and suction.\par -Rx:Debrox be placed in both ears on a routine basis to keep them free of wax.\par -Routine debridement suggested to keep the ears free of wax.\par \par Bilateral SNHL:\par -cleared for hearing aids\par -Hearing Test performed to evaluate the extent of hearing loss and to explain pt's symptoms\par \par f/u prn

## 2022-05-25 NOTE — END OF VISIT
[FreeTextEntry3] : I personally saw and examined TOBI JONES in detail. I spoke to KHRIS Arciniega regarding the assessment and plan of care. I reviewed the above assessment and plan of care, and agree. I have made changes in changes in the body of the note where appropriate.I personally reviewed the HPI, PMH, FH, SH, ROS and medications/allergies. I have spoken to KHRIS Arciniega regarding the history and have personally determined the assessment and plan of care, and documented this myself. I was present and participated in all key portions of the encounter and all procedures noted above. I have made changes in the body of the note where appropriate.\par \par Attesting Faculty: See Attending Signature Below \par \par \par  [Time Spent: ___ minutes] : I have spent [unfilled] minutes of time on the encounter.

## 2022-07-14 ENCOUNTER — APPOINTMENT (OUTPATIENT)
Dept: NEUROLOGY | Facility: CLINIC | Age: 87
End: 2022-07-14

## 2023-03-16 ENCOUNTER — NON-APPOINTMENT (OUTPATIENT)
Age: 88
End: 2023-03-16

## 2023-03-27 ENCOUNTER — NON-APPOINTMENT (OUTPATIENT)
Age: 88
End: 2023-03-27

## 2023-03-27 ENCOUNTER — APPOINTMENT (OUTPATIENT)
Dept: GERIATRICS | Facility: CLINIC | Age: 88
End: 2023-03-27
Payer: MEDICARE

## 2023-03-27 VITALS
TEMPERATURE: 97.8 F | HEART RATE: 72 BPM | DIASTOLIC BLOOD PRESSURE: 80 MMHG | HEIGHT: 60 IN | SYSTOLIC BLOOD PRESSURE: 142 MMHG | BODY MASS INDEX: 25.91 KG/M2 | RESPIRATION RATE: 16 BRPM | OXYGEN SATURATION: 96 % | WEIGHT: 132 LBS

## 2023-03-27 DIAGNOSIS — E53.8 DEFICIENCY OF OTHER SPECIFIED B GROUP VITAMINS: ICD-10-CM

## 2023-03-27 DIAGNOSIS — F05 DELIRIUM DUE TO KNOWN PHYSIOLOGICAL CONDITION: ICD-10-CM

## 2023-03-27 DIAGNOSIS — E55.9 VITAMIN D DEFICIENCY, UNSPECIFIED: ICD-10-CM

## 2023-03-27 DIAGNOSIS — Z13.820 ENCOUNTER FOR SCREENING FOR OSTEOPOROSIS: ICD-10-CM

## 2023-03-27 DIAGNOSIS — Z13.31 ENCOUNTER FOR SCREENING FOR DEPRESSION: ICD-10-CM

## 2023-03-27 DIAGNOSIS — Z13.21 ENCOUNTER FOR SCREENING FOR NUTRITIONAL DISORDER: ICD-10-CM

## 2023-03-27 DIAGNOSIS — Z11.59 ENCOUNTER FOR SCREENING FOR OTHER VIRAL DISEASES: ICD-10-CM

## 2023-03-27 PROCEDURE — 99483 ASSMT & CARE PLN PT COG IMP: CPT

## 2023-03-27 PROCEDURE — 93000 ELECTROCARDIOGRAM COMPLETE: CPT | Mod: 59

## 2023-03-27 RX ORDER — CHLORHEXIDINE GLUCONATE 4 %
LIQUID (ML) TOPICAL
Refills: 0 | Status: DISCONTINUED | COMMUNITY
End: 2023-03-27

## 2023-03-28 LAB
25(OH)D3 SERPL-MCNC: 36.4 NG/ML
ALBUMIN SERPL ELPH-MCNC: 4.6 G/DL
ALP BLD-CCNC: 70 U/L
ALT SERPL-CCNC: 17 U/L
ANION GAP SERPL CALC-SCNC: 13 MMOL/L
AST SERPL-CCNC: 19 U/L
BASOPHILS # BLD AUTO: 0.03 K/UL
BASOPHILS NFR BLD AUTO: 0.5 %
BILIRUB SERPL-MCNC: 0.3 MG/DL
BUN SERPL-MCNC: 23 MG/DL
CALCIUM SERPL-MCNC: 9.9 MG/DL
CHLORIDE SERPL-SCNC: 102 MMOL/L
CHOLEST SERPL-MCNC: 244 MG/DL
CO2 SERPL-SCNC: 22 MMOL/L
COVID-19 NUCLEOCAPSID  GAM ANTIBODY INTERPRETATION: POSITIVE
CREAT SERPL-MCNC: 1.05 MG/DL
EGFR: 52 ML/MIN/1.73M2
EOSINOPHIL # BLD AUTO: 0.04 K/UL
EOSINOPHIL NFR BLD AUTO: 0.7 %
ESTIMATED AVERAGE GLUCOSE: 103 MG/DL
FOLATE SERPL-MCNC: 11.8 NG/ML
GLUCOSE SERPL-MCNC: 94 MG/DL
HBA1C MFR BLD HPLC: 5.2 %
HCT VFR BLD CALC: 33.6 %
HDLC SERPL-MCNC: 66 MG/DL
HGB BLD-MCNC: 10.6 G/DL
IMM GRANULOCYTES NFR BLD AUTO: 0.5 %
LDLC SERPL CALC-MCNC: 143 MG/DL
LYMPHOCYTES # BLD AUTO: 1.44 K/UL
LYMPHOCYTES NFR BLD AUTO: 23.5 %
MAN DIFF?: NORMAL
MCHC RBC-ENTMCNC: 30.5 PG
MCHC RBC-ENTMCNC: 31.5 GM/DL
MCV RBC AUTO: 96.8 FL
MONOCYTES # BLD AUTO: 0.48 K/UL
MONOCYTES NFR BLD AUTO: 7.8 %
NEUTROPHILS # BLD AUTO: 4.1 K/UL
NEUTROPHILS NFR BLD AUTO: 67 %
NONHDLC SERPL-MCNC: 179 MG/DL
PLATELET # BLD AUTO: 319 K/UL
POTASSIUM SERPL-SCNC: 5 MMOL/L
PROT SERPL-MCNC: 7.1 G/DL
RBC # BLD: 3.47 M/UL
RBC # FLD: 13.6 %
SARS-COV-2 AB SERPL QL IA: 12.6 INDEX
SODIUM SERPL-SCNC: 137 MMOL/L
TRIGL SERPL-MCNC: 179 MG/DL
TSH SERPL-ACNC: 0.91 UIU/ML
VIT B12 SERPL-MCNC: 1830 PG/ML
WBC # FLD AUTO: 6.12 K/UL

## 2023-04-03 ENCOUNTER — APPOINTMENT (OUTPATIENT)
Dept: GERIATRICS | Facility: CLINIC | Age: 88
End: 2023-04-03
Payer: MEDICARE

## 2023-04-03 VITALS
OXYGEN SATURATION: 97 % | TEMPERATURE: 97.9 F | HEIGHT: 60 IN | SYSTOLIC BLOOD PRESSURE: 122 MMHG | BODY MASS INDEX: 25.56 KG/M2 | HEART RATE: 78 BPM | DIASTOLIC BLOOD PRESSURE: 70 MMHG | RESPIRATION RATE: 16 BRPM | WEIGHT: 130.2 LBS

## 2023-04-03 DIAGNOSIS — H91.90 UNSPECIFIED HEARING LOSS, UNSPECIFIED EAR: ICD-10-CM

## 2023-04-03 LAB — VIT B1 SERPL-MCNC: 104.3 NMOL/L

## 2023-04-03 PROCEDURE — 99215 OFFICE O/P EST HI 40 MIN: CPT

## 2023-04-03 NOTE — ASSESSMENT
[FreeTextEntry1] : - Labs reviewed - not impressive \par Could hold B12 supplement \par \par - Medications reviewed and discussed\par - Discussed trial SSRi -  declines\par - Discussed r/b/a of using antipsychotic medications PRN for severe agitation note amenable to behavioral interventions.  We discussed FDA black box warning of increased risk of stroke, myocardial infarction, EPS/parkinsonism, sedation, falls, and death, among other potential side effects. After discussion we decide that benefits outweigh the risks for patient and caregiver safety and quality of life.\par We decide to trial Seroquel 12.5mg daily PRN severe agitation not amenable to behavioral interventions\par \par - Discussed behavioral interventions\par  \par - Advised inc daytime physical/cognitive activity - pt pending start of [facility] Day program - forms faxed and original given to  today\par \par - May benefit from formal help at home d/t  is primary CG and overwhelmed \par - Advised to consider support groups and classes for caregiver\par - Education, counseling, and support provided \par - Referred to  for additional counseling, education, support, and community resources\par \par - Adv trial Hearing Aids  \par - f/u ACP documents to review  and further discuss GOC/MOLST completion \par \par - f/u with DCS/NP Domingo for ADC program as scheduled on 4/13/23\par \par Rest as per PMD\par \par f/u with me PRN\par

## 2023-04-03 NOTE — HISTORY OF PRESENT ILLNESS
[PapSmeardate] : 5/22   [TextBox_31] : Seen by ENT Dr. Peterson in 5/22 - cleared for HAs [BoneDensityDate] : 2021 [TextBox_37] : s/p cataract sx, last ophtho, wears reading glasses  [Driving Concerns] : not driving or driving without noted concerns [[facility]] : >12  [FreeTextEntry1] : needs prompting/encouragement/reminders to bathe [de-identified] : sometimes needs help figuring out the phone  [de-identified] : sometimes cooks too much food, "always burned pots on the stove", disorganized [de-identified] :  helps  [de-identified] :  helps  [FreeTextEntry6] : stopped driving in 2018 b/c  was worried about her driving [FreeTextEntry7] :  manages and reminds  [de-identified] :  took over approx 2019  [de-identified] : stopped driving in 2018 b/c  was worried about her driving  [de-identified] : PHQ2 of 2 on 3/27/23  [NWM4Fmaou] : 2 [CornellScale] : 10 on 3/27/23  [GDS] : 3 [Stable] : Status: Stable [Memory Lapses Or Loss] : stable memory impairment [Patient Observed To Be Agitated] : stable agitation [Hostility Toward Caregivers] : stable aggression [Fixed Beliefs Contradicted By Reality (Delusions)] : stable delusions [Difficulty Finding Desired Words] : stable difficulty finding desired words [AdvancecareDate] : 4/3/23 [FreeTextEntry4] : HCP form not on file -  states he's primary, and dtr Doreen is alternate - he is not sure where the forms are but will look at home and bring to next visit. \par What matters most: family, avoiding aggressive interventions at EOL.  Would prefer natural death.  Wants to be comfortable. \par MOLST not on file - discussed and completion pending

## 2023-04-03 NOTE — SOCIAL HISTORY
[FreeTextEntry1] : no formal help  [FreeTextEntry3] :  has been working from home since pandemic started -  - and is retiring.

## 2023-04-03 NOTE — PHYSICAL EXAM
[Normal Gait] : abnormal gait [Normal Insight/Judgment] : insight and judgment were not intact [de-identified] : LISA-7 of 5 on 4/3/23  [MocaTotal] : 6 [FreeTextEntry1] : 3/27/23

## 2023-05-16 ENCOUNTER — APPOINTMENT (OUTPATIENT)
Dept: GERIATRICS | Facility: CLINIC | Age: 88
End: 2023-05-16

## 2023-06-12 ENCOUNTER — APPOINTMENT (OUTPATIENT)
Dept: PAIN MANAGEMENT | Facility: CLINIC | Age: 88
End: 2023-06-12
Payer: MEDICARE

## 2023-06-12 VITALS
HEIGHT: 60 IN | BODY MASS INDEX: 24.74 KG/M2 | WEIGHT: 126 LBS | DIASTOLIC BLOOD PRESSURE: 64 MMHG | SYSTOLIC BLOOD PRESSURE: 141 MMHG | HEART RATE: 76 BPM

## 2023-06-12 DIAGNOSIS — R51.9 HEADACHE, UNSPECIFIED: ICD-10-CM

## 2023-06-12 PROCEDURE — 99205 OFFICE O/P NEW HI 60 MIN: CPT

## 2023-06-12 NOTE — ASSESSMENT
[FreeTextEntry1] : 86 y/o F with Pmhx FTD, chronic daily headaches - cervicogenic who presents with increased intensity, duration and as well as quality of headaches, occasionally associated with numbness and burning sensation in both hands. Exam with tenderness and trigger paints B/L Cervical paraspinals, traps as well as tenderness over R occipital nerve. \par \par Extensively discussed the nature of Migraine headaches as well as importance of lifestyle modifications including sleep, diet, exercise, proper hydration, avoidance of caffeine, limiting alcohol intake as well as avoidance of triggers. We also discussed nonpharmacologic treatments including therapeutic massage, breathing exercises, muscle relaxation techniques, acupuncture, acupressure as well as CBT. \par \par -Given change in quality, intensity and duration of her headaches as well as h/o PCV recommend MRI brain to r/o structural pathology. \par -continue Mag 400 mg daily . \par TPI B/L traps and C spine performed today as well as R ONB w/out AE. \par Will consider oral meds after w/u complete\par consider Neck PT after imaging. \par fu in 4-6 weeks for repeat TPI if favorable response. \par \par The patient had the opportunity to ask questions and to provide feedback. All questions were answered accordingly.  The patient verbalized understanding of of the management plan.\par \par

## 2023-06-12 NOTE — HISTORY OF PRESENT ILLNESS
[FreeTextEntry1] : Ms. TOBI JONES is a 87 year old RH female with Pmhx of Polycythemia Vera, Frontotemporal Dementia, cervical stenosis with with cervicogenic headaches previously managed by Dr. Conway who presents for initial evaluation of headaches.  Patient reports daily headaches for many years initially severe but overall improved for the last few years, less intense and typically would come on later in the day.  Over the last month headache significantly worsened essentially constant bitemporal 9/10 , no photo or phonophobia, + burning/tingling in hands with more severe headaches.  She is taking Aleve and Excedrin prn . Excedrin onset faster but Aleve better.  \par \par She is sleeping 7-8 hours per night. \par \par Previous tried trigger points, acupuncture. \par Prior meds include: Elavil ineffective, Pamelor,  Abortives include- NSAIDs, Tylenol, Tizanidine ineffective. \par Current meds include: Jakafi ( PCV), ASA 81 mg daily, Magnesium,Calcium\par \par Social hx:   and lives in [place] with her  who is the primary care giver.

## 2023-06-12 NOTE — PROCEDURE
[FreeTextEntry1] : The procedure risks, hazards and alternatives were discussed with the patient and appropriate consent was obtained. The area over the myofascial spasm / pain was prepped with alcohol utilizing sterile technique. After isolating it between two palpating fingertips a 27 gauge 1.5” needle was placed in the center of the myofascial spasm and a negative aspiration was performed. A total of 6 mL of 1% Lidocaine mixed with Kenalog 40 mg was injected into 4 sites. The patient tolerated procedure well without any apparent difficulties or complications. \par \par The procedure risks, hazards and alternatives were discussed with the patient and appropriate consent was obtained. The patient's left occipital area was palpated to identify location of greater occipital nerve. Alcohol was applied topically to the skin. Using a 27 gauge needle (aspirating during insertion), 2.5 cc of a mixture of Kenalog mg, 1% lidocaine was injected on the right side (directing needle to center, left and right of painful focus). Pressure with a gauze pad was held briefly upon the site of puncture to minimize bleeding and to further spread anaesthetic subcutaneously. . The patient tolerated procedure well without any apparent difficulties or complications. \par \par TOBI JONES was monitored in the office for 10 minutes after injections and tolerated procedure well without adverse events.\par

## 2023-06-12 NOTE — PHYSICAL EXAM
[FreeTextEntry1] : Constitutional: No signs of distress. No signs of toxicity. \par Head/Neck/spine: + tenderness right occipital nerve, Examination of the cervical spine reveals normal range of motion in the neck, + cervical paraspinal muscle and trap muscle tenderness, no facet tenderness, negative Spurling's bilaterally. Examination of the lumbar spine reveals normal range of motion including flexion, extension and lateral rotation. No midline tenderness, no paraspinal muscle tenderness, negative facet loading,  no tenderness of sciatic notch, no tenderness of bilateral greater trochanters,\par MS: Alert and well oriented. Speech fluent. No aphasia. Fund of knowledge intact. \par Psychiatric: Mood stable.\par Motor: Adequate bulk, tone, strength. 5/5 strength\par DTR: : 2+ b/l biceps, 2+ triceps, 2 + brachioradialis, 2+ patellar, and 2+ ankle reflexes. Plantar responses were flexor bilaterally, no clonus\par Sensory: intact to primary and secondary modalities; \par Cerebellar and gait: intact\par Eyes: no redness or swelling\par Pulmonary: no respiratory distress\par Vascular: no temperature,color changes; no edema\par Musculoskeletal:  no joint deformities ,  no scoliosis, lordosis, kyphosis\par Skin: No rash.\par \par \par

## 2023-06-14 LAB
CRP SERPL-MCNC: <3 MG/L
ERYTHROCYTE [SEDIMENTATION RATE] IN BLOOD BY WESTERGREN METHOD: 14 MM/HR

## 2023-06-23 ENCOUNTER — OUTPATIENT (OUTPATIENT)
Dept: OUTPATIENT SERVICES | Facility: HOSPITAL | Age: 88
LOS: 1 days | End: 2023-06-23
Payer: MEDICARE

## 2023-06-23 ENCOUNTER — APPOINTMENT (OUTPATIENT)
Dept: MRI IMAGING | Facility: CLINIC | Age: 88
End: 2023-06-23
Payer: MEDICARE

## 2023-06-23 DIAGNOSIS — R51.9 HEADACHE, UNSPECIFIED: ICD-10-CM

## 2023-06-23 PROCEDURE — 70551 MRI BRAIN STEM W/O DYE: CPT

## 2023-06-23 PROCEDURE — 70551 MRI BRAIN STEM W/O DYE: CPT | Mod: 26

## 2023-07-27 ENCOUNTER — APPOINTMENT (OUTPATIENT)
Dept: PAIN MANAGEMENT | Facility: CLINIC | Age: 88
End: 2023-07-27
Payer: MEDICARE

## 2023-07-27 PROCEDURE — 99214 OFFICE O/P EST MOD 30 MIN: CPT | Mod: 25

## 2023-07-27 PROCEDURE — 20553 NJX 1/MLT TRIGGER POINTS 3/>: CPT

## 2023-07-27 PROCEDURE — 64405 NJX AA&/STRD GR OCPL NRV: CPT | Mod: 50,59

## 2023-07-27 NOTE — ASSESSMENT
[FreeTextEntry1] : 88 y/o F with Pmhx FTD, chronic daily headaches - cervicogenic who presents with increased intensity, duration and as well as quality of headaches, occasionally associated with numbness and burning sensation in both hands. Exam with tenderness and trigger paints B/L Cervical paraspinals, traps as well as tenderness over B/L occipital nerve. \par \par \par MRI brain partially completed, prefers to defer imaging at this time. \par -continue Mag 400 mg daily . \par TPI B/L traps and C spine performed today as well as B/L  ONB w/out AE. \par Will consider oral meds after w/u complete\par consider Neck PT \par fu in 3 months or sooner if needed for repeat TPI given favorable response. \par \par The patient had the opportunity to ask questions and to provide feedback. All questions were answered accordingly.  The patient verbalized understanding of of the management plan.\par \par

## 2023-07-27 NOTE — HISTORY OF PRESENT ILLNESS
[FreeTextEntry1] : 87 year old RH female with Pmhx of Polycythemia Vera, Frontotemporal Dementia, cervical stenosis with with chronic daily headaches - cervicogenic component previously managed by Dr. Conway initially seen in June 2023 with worsening headaches.  Since her last visit reports HA and neck pain improved.  Was having daily headache essentially bitemporal 9/10 , no photo or phonophobia, + burning/tingling in hands with more severe headaches.  \par She is no longer having radicular sx down the arm and headaches are occurring every other day.  \par \par She is taking Aleve and Excedrin prn . Excedrin onset faster but Aleve better.  \par \par She is sleeping 7-8 hours per night. \par \par Previous tried trigger points, acupuncture. \par Prior meds include: Elavil ineffective, Pamelor,  Abortives include- NSAIDs, Tylenol, Tizanidine ineffective. \par Current meds include: Jakafi ( PCV), ASA 81 mg daily, Magnesium,Calcium\par \par Social hx:   and lives in [place] with her  who is the primary care giver.

## 2023-09-07 ENCOUNTER — NON-APPOINTMENT (OUTPATIENT)
Age: 88
End: 2023-09-07

## 2023-11-29 ENCOUNTER — APPOINTMENT (OUTPATIENT)
Dept: PAIN MANAGEMENT | Facility: CLINIC | Age: 88
End: 2023-11-29

## 2024-02-29 ENCOUNTER — APPOINTMENT (OUTPATIENT)
Dept: GERIATRICS | Facility: CLINIC | Age: 89
End: 2024-02-29
Payer: MEDICARE

## 2024-02-29 VITALS
HEART RATE: 72 BPM | WEIGHT: 130.13 LBS | BODY MASS INDEX: 25.55 KG/M2 | HEIGHT: 60 IN | SYSTOLIC BLOOD PRESSURE: 146 MMHG | OXYGEN SATURATION: 99 % | DIASTOLIC BLOOD PRESSURE: 81 MMHG

## 2024-02-29 DIAGNOSIS — R73.9 HYPERGLYCEMIA, UNSPECIFIED: ICD-10-CM

## 2024-02-29 DIAGNOSIS — Z71.89 OTHER SPECIFIED COUNSELING: ICD-10-CM

## 2024-02-29 DIAGNOSIS — Z11.1 ENCOUNTER FOR SCREENING FOR RESPIRATORY TUBERCULOSIS: ICD-10-CM

## 2024-02-29 DIAGNOSIS — Z13.29 ENCOUNTER FOR SCREENING FOR OTHER SUSPECTED ENDOCRINE DISORDER: ICD-10-CM

## 2024-02-29 PROCEDURE — 99215 OFFICE O/P EST HI 40 MIN: CPT

## 2024-03-01 LAB
ALBUMIN SERPL ELPH-MCNC: 4.5 G/DL
ALP BLD-CCNC: 89 U/L
ALT SERPL-CCNC: 15 U/L
ANION GAP SERPL CALC-SCNC: 15 MMOL/L
AST SERPL-CCNC: 15 U/L
BASOPHILS # BLD AUTO: 0.03 K/UL
BASOPHILS NFR BLD AUTO: 0.4 %
BILIRUB SERPL-MCNC: 0.2 MG/DL
BUN SERPL-MCNC: 26 MG/DL
CALCIUM SERPL-MCNC: 10 MG/DL
CHLORIDE SERPL-SCNC: 102 MMOL/L
CHOLEST SERPL-MCNC: 287 MG/DL
CO2 SERPL-SCNC: 20 MMOL/L
CREAT SERPL-MCNC: 1.39 MG/DL
EGFR: 37 ML/MIN/1.73M2
EOSINOPHIL # BLD AUTO: 0.06 K/UL
EOSINOPHIL NFR BLD AUTO: 0.8 %
ESTIMATED AVERAGE GLUCOSE: 105 MG/DL
GLUCOSE SERPL-MCNC: 100 MG/DL
HBA1C MFR BLD HPLC: 5.3 %
HCT VFR BLD CALC: 35.6 %
HDLC SERPL-MCNC: 61 MG/DL
HGB BLD-MCNC: 11.7 G/DL
IMM GRANULOCYTES NFR BLD AUTO: 0.5 %
LDLC SERPL CALC-MCNC: 183 MG/DL
LYMPHOCYTES # BLD AUTO: 1.87 K/UL
LYMPHOCYTES NFR BLD AUTO: 25.6 %
MAN DIFF?: NORMAL
MCHC RBC-ENTMCNC: 31.5 PG
MCHC RBC-ENTMCNC: 32.9 GM/DL
MCV RBC AUTO: 95.7 FL
MONOCYTES # BLD AUTO: 0.64 K/UL
MONOCYTES NFR BLD AUTO: 8.8 %
NEUTROPHILS # BLD AUTO: 4.66 K/UL
NEUTROPHILS NFR BLD AUTO: 63.9 %
NONHDLC SERPL-MCNC: 225 MG/DL
PLATELET # BLD AUTO: 426 K/UL
POTASSIUM SERPL-SCNC: 4.7 MMOL/L
PROT SERPL-MCNC: 7.5 G/DL
RBC # BLD: 3.72 M/UL
RBC # FLD: 13.1 %
SODIUM SERPL-SCNC: 138 MMOL/L
TRIGL SERPL-MCNC: 224 MG/DL
TSH SERPL-ACNC: 0.95 UIU/ML
WBC # FLD AUTO: 7.3 K/UL

## 2024-03-06 LAB
M TB IFN-G BLD-IMP: NEGATIVE
QUANTIFERON TB PLUS MITOGEN MINUS NIL: 0.68 IU/ML
QUANTIFERON TB PLUS NIL: 0.03 IU/ML
QUANTIFERON TB PLUS TB1 MINUS NIL: 0 IU/ML
QUANTIFERON TB PLUS TB2 MINUS NIL: 0 IU/ML

## 2024-03-13 ENCOUNTER — NON-APPOINTMENT (OUTPATIENT)
Age: 89
End: 2024-03-13

## 2024-03-17 ENCOUNTER — NON-APPOINTMENT (OUTPATIENT)
Age: 89
End: 2024-03-17

## 2024-03-27 PROBLEM — Z71.89 ADVANCE CARE PLANNING: Status: ACTIVE | Noted: 2022-05-19

## 2024-03-27 NOTE — ASSESSMENT
[FreeTextEntry1] : Lab work ordered - f/u results  - Medications reviewed and discussed - Will consider trial SSRi in addition to Quetiapine  - Discussed r/b/a of using antipsychotic medications e.g Quetiapine for severe agitation note amenable to behavioral interventions.  We discussed FDA black box warning of increased risk of stroke, myocardial infarction, EPS/parkinsonism, sedation, falls, and death, among other potential side effects. After discussion we decide that benefits outweigh the risks for patient and caregiver safety and quality of life. We decide to trial Seroquel 12.5mg daily PRN severe agitation not amenable to behavioral interventions  - Discussed behavioral interventions - Discussed option of alternate living environments - assisted vs LTC placement, etc.  Family agrees to TBT w/ quantiferon in case decide on [facility] transfer - will need to r/o active TB.    - Education, counseling, and support provided  - Referred to  for additional counseling, education, support, and community resources  - f/u ACP documents to review and further discuss GOC/MOLST completion   f/u with me within 1 mo, unless earlier PRN   Rest as per PMD

## 2024-03-27 NOTE — REASON FOR VISIT
[Follow-Up] : a follow-up visit [Spouse] : spouse [Formal Caregiver] : formal caregiver [FreeTextEntry1] : dementia, agitation [FreeTextEntry3] :  Jon and VAZQUEZ Amezcua  [FreeTextEntry2] : who assists with History d/t pt with baseline memory loss

## 2024-03-27 NOTE — HISTORY OF PRESENT ILLNESS
[Patient reported hearing was abnormal] : Patient reported hearing was abnormal [No falls in past year] : Patient reported no falls in the past year [Independent] : transferring/mobility [Full assistance needed] : Assistance needed managing medications [FAST Score: ____] : Functional Assessment Scale (FAST) Score: [unfilled] [Carbon Monoxide Detector] : carbon monoxide detector [Smoke Detector] : smoke detector [PHQ-2 Negative - No further assessment needed] : PHQ-2 Negative - No further assessment needed [1] : 2) Feeling down, depressed, or hopeless for several days (1) [I have developed a follow-up plan documented below in the note.] : I have developed a follow-up plan documented below in the note. [Stable] : Status: Stable [Moderate] : Stage: Moderate [Patient Observed To Be Agitated] : stable agitation [Memory Lapses Or Loss] : stable memory impairment [Hostility Toward Caregivers] : stable aggression [] : denies wandering [Sleep Disturbances] : denies sleep disturbances [Difficulty Finding Desired Words] : stable difficulty finding desired words [Fixed Beliefs Contradicted By Reality (Delusions)] : stable delusions [Reviewed no changes] : Reviewed, no changes [PapSmeardate] : 5/22   [TextBox_31] : Seen by ENT Dr. Peterson in 5/22 - cleared for HAs [BoneDensityDate] : 2021 [TextBox_37] : s/p cataract sx, last ophtho, wears reading glasses  [Driving Concerns] : not driving or driving without noted concerns [[facility]] : >12  [FreeTextEntry1] : needs prompting/encouragement/reminders to bathe [de-identified] : sometimes needs help figuring out the phone  [de-identified] : sometimes cooks too much food, "always burned pots on the stove", disorganized [de-identified] :  helps  [de-identified] :  helps  [FreeTextEntry7] :  manages and reminds  [FreeTextEntry6] : stopped driving in 2018 b/c  was worried about her driving [de-identified] :  took over approx 2019  [de-identified] : stopped driving in 2018 b/c  was worried about her driving  [HPR2Tkqvd] : 2 [de-identified] : PHQ2 of 2 on 3/27/23  [AdvancecareDate] : 2/29/24 [CornellScale] : 10 on 3/27/23  [FreeTextEntry4] : HCP form not on file -  states he's primary, and dtr Doreen is alternate - he is not sure where the forms are but will look at home and bring to next visit.  What matters most: family, avoiding aggressive interventions at EOL.  Would prefer natural death.  Wants to be comfortable.  MOLST not on file - discussed and completion pending

## 2024-03-27 NOTE — SOCIAL HISTORY
[With spouse] : lives with spouse [House] : in a house [FreeTextEntry3] :  has been working from home since pandemic started -  - and is retiring. 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

## 2024-03-27 NOTE — PHYSICAL EXAM
[Normal] : normal skin color and pigmentation [No Focal Deficits] : no focal deficits [Normal Affect] : the affect was normal [Normal Mood] : the mood was normal [Normal Gait] : abnormal gait [Normal Insight/Judgment] : insight and judgment were not intact [de-identified] : LISA-7 of 5 on 4/3/23  [MocaTotal] : 6 [FreeTextEntry1] : 3/27/23

## 2024-04-04 ENCOUNTER — NON-APPOINTMENT (OUTPATIENT)
Age: 89
End: 2024-04-04

## 2024-04-04 ENCOUNTER — APPOINTMENT (OUTPATIENT)
Dept: GERIATRICS | Facility: CLINIC | Age: 89
End: 2024-04-04
Payer: MEDICARE

## 2024-04-04 VITALS
BODY MASS INDEX: 25.99 KG/M2 | WEIGHT: 132.38 LBS | TEMPERATURE: 97.3 F | SYSTOLIC BLOOD PRESSURE: 153 MMHG | HEART RATE: 71 BPM | HEIGHT: 60 IN | OXYGEN SATURATION: 96 % | DIASTOLIC BLOOD PRESSURE: 80 MMHG

## 2024-04-04 DIAGNOSIS — F03.918 UNSPECIFIED DEMENTIA, UNSPECIFIED SEVERITY, WITH OTHER BEHAVIORAL DISTURBANCE: ICD-10-CM

## 2024-04-04 DIAGNOSIS — Z63.6 DEPENDENT RELATIVE NEEDING CARE AT HOME: ICD-10-CM

## 2024-04-04 DIAGNOSIS — F41.9 ANXIETY DISORDER, UNSPECIFIED: ICD-10-CM

## 2024-04-04 DIAGNOSIS — D45 POLYCYTHEMIA VERA: ICD-10-CM

## 2024-04-04 DIAGNOSIS — R45.1 RESTLESSNESS AND AGITATION: ICD-10-CM

## 2024-04-04 DIAGNOSIS — G47.00 INSOMNIA, UNSPECIFIED: ICD-10-CM

## 2024-04-04 DIAGNOSIS — F29 UNSPECIFIED PSYCHOSIS NOT DUE TO A SUBSTANCE OR KNOWN PHYSIOLOGICAL CONDITION: ICD-10-CM

## 2024-04-04 DIAGNOSIS — Z74.1 NEED FOR ASSISTANCE WITH PERSONAL CARE: ICD-10-CM

## 2024-04-04 PROCEDURE — 93000 ELECTROCARDIOGRAM COMPLETE: CPT | Mod: 59

## 2024-04-04 PROCEDURE — 99215 OFFICE O/P EST HI 40 MIN: CPT | Mod: 25

## 2024-04-04 RX ORDER — RIBOFLAVIN (VITAMIN B2) 100 MG
100 TABLET ORAL DAILY
Qty: 1 | Refills: 0 | Status: ACTIVE | COMMUNITY
Start: 2023-03-27 | End: 1900-01-01

## 2024-04-04 RX ORDER — SERTRALINE 25 MG/1
25 TABLET, FILM COATED ORAL
Qty: 30 | Refills: 0 | Status: ACTIVE | COMMUNITY
Start: 2024-04-04 | End: 1900-01-01

## 2024-04-04 RX ORDER — CALCIUM CARBONATE/VITAMIN D3 600 MG-20
600-20 TABLET ORAL DAILY
Qty: 90 | Refills: 0 | Status: ACTIVE | COMMUNITY
Start: 1900-01-01 | End: 1900-01-01

## 2024-04-04 RX ORDER — ASPIRIN ENTERIC COATED TABLETS 81 MG 81 MG/1
81 TABLET, DELAYED RELEASE ORAL DAILY
Qty: 90 | Refills: 0 | Status: ACTIVE | COMMUNITY
Start: 2024-04-04 | End: 1900-01-01

## 2024-04-04 RX ORDER — RUXOLITINIB 10 MG/1
10 TABLET ORAL TWICE DAILY
Qty: 60 | Refills: 0 | Status: ACTIVE | COMMUNITY
Start: 2017-02-09 | End: 1900-01-01

## 2024-04-04 RX ORDER — UBIDECARENONE/VIT E ACET 100MG-5
CAPSULE ORAL
Refills: 0 | Status: DISCONTINUED | COMMUNITY
End: 2024-04-04

## 2024-04-04 RX ORDER — PNV NO.95/FERROUS FUM/FOLIC AC 28MG-0.8MG
TABLET ORAL
Refills: 0 | Status: DISCONTINUED | COMMUNITY
End: 2024-04-04

## 2024-04-04 RX ORDER — QUETIAPINE FUMARATE 25 MG/1
25 TABLET ORAL TWICE DAILY
Qty: 30 | Refills: 0 | Status: ACTIVE | COMMUNITY
Start: 2023-04-03 | End: 1900-01-01

## 2024-04-04 RX ORDER — BACILLUS COAGULANS/INULIN 1B-250 MG
1-250 CAPSULE ORAL DAILY
Qty: 90 | Refills: 0 | Status: ACTIVE | COMMUNITY
Start: 1900-01-01 | End: 1900-01-01

## 2024-04-04 RX ORDER — MAGNESIUM 200 MG
200 TABLET ORAL DAILY
Qty: 90 | Refills: 0 | Status: ACTIVE | COMMUNITY
Start: 2023-03-27 | End: 1900-01-01

## 2024-04-04 RX ORDER — SODIUM PHOSPHATE 7; 19 G/118ML; G/118ML
0.5 ENEMA RECTAL
Refills: 0 | Status: DISCONTINUED | COMMUNITY
End: 2024-04-04

## 2024-04-04 RX ORDER — ASPIRIN 81 MG
81 TABLET, DELAYED RELEASE (ENTERIC COATED) ORAL
Refills: 0 | Status: DISCONTINUED | COMMUNITY
End: 2024-04-04

## 2024-04-04 SDOH — SOCIAL STABILITY - SOCIAL INSECURITY: DEPENDENT RELATIVE NEEDING CARE AT HOME: Z63.6

## 2024-04-07 NOTE — ASSESSMENT
[FreeTextEntry1] :  - Medications reviewed and discussed  - EKG done and reviewed today: SR@ 67bpm, QTc wnl, nonspecific Twave abnormality  - Discussed r/b/a of trial Sertraline in addition to Quetiapine, and agree to try 25mg daily in AM. Will titrate up as tolerated.   - Discussed r/b/a of using antipsychotic medications e.g Quetiapine for severe agitation note amenable to behavioral interventions.  We discussed FDA black box warning of increased risk of stroke, myocardial infarction, EPS/parkinsonism, sedation, falls, and death, among other potential side effects. After discussion we decide that benefits outweigh the risks for patient and caregiver safety and quality of life. We decide to c/w Seroquel 12.5mg BID for psychosis and aggressive behavior not amenable to behavioral interventions  - Discussed behavioral interventions  Will complete [facility] forms as per request and fax to [facility] Meds sent to specialty pharmacy per request  - Education, counseling, and support provided  - Referred to  for additional counseling, education, support, and community resources  - f/u ACP documents to review and further discuss GOC/MOLST completion   Repeat BMP at next visit  f/u with me in 1 mo, unless earlier PRN   Rest as per PMD

## 2024-04-07 NOTE — SOCIAL HISTORY
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

## 2024-04-07 NOTE — HISTORY OF PRESENT ILLNESS
[Patient reported hearing was abnormal] : Patient reported hearing was abnormal [No falls in past year] : Patient reported no falls in the past year [Independent] : transferring/mobility [Full assistance needed] : Assistance needed managing medications [FAST Score: ____] : Functional Assessment Scale (FAST) Score: [unfilled] [Smoke Detector] : smoke detector [Carbon Monoxide Detector] : carbon monoxide detector [de-identified] : sometimes needs help figuring out the phone  [1] : 2) Feeling down, depressed, or hopeless for several days (1) [PHQ-2 Negative - No further assessment needed] : PHQ-2 Negative - No further assessment needed [I have developed a follow-up plan documented below in the note.] : I have developed a follow-up plan documented below in the note. [IZK6Mmlyy] : 2 [CornellScale] : 10 on 3/27/23  [Moderate] : Stage: Moderate [Stable] : Status: Stable [Memory Lapses Or Loss] : stable memory impairment [Patient Observed To Be Agitated] : stable agitation [Hostility Toward Caregivers] : stable aggression [Sleep Disturbances] : denies sleep disturbances [] : denies wandering [Fixed Beliefs Contradicted By Reality (Delusions)] : stable delusions [Difficulty Finding Desired Words] : stable difficulty finding desired words [Reviewed no changes] : Reviewed, no changes [PapSmeardate] : 5/22   [TextBox_31] : Seen by ENT Dr. Peterson in 5/22 - cleared for HAs [BoneDensityDate] : 2021 [TextBox_37] : s/p cataract sx, last ophtho, wears reading glasses  [Completely Dependent] : Completely dependent. [Driving Concerns] : not driving or driving without noted concerns [facility]] : >12  [FreeTextEntry1] : needs prompting/encouragement/reminders to bathe [de-identified] : sometimes cooks too much food, "always burned pots on the stove", disorganized [de-identified] :  helps  [de-identified] :  helps  [FreeTextEntry6] : stopped driving in 2018 b/c  was worried about her driving [de-identified] :  took over approx 2019  [FreeTextEntry7] :  manages and reminds  [de-identified] : stopped driving in 2018 b/c  was worried about her driving  [Medical reason not done] : Medical reason not done [de-identified] : Baseline dementia, on Quetiapine [With Patient/Caregiver] : , with patient/caregiver [AdvancecareDate] : 4/4/24 [FreeTextEntry4] : HCP form not on file -  is reportedly primary, and dtr Doreen is alternate? Family to bring forms to next visit   Per prior discussion w/  - what matters most: family, avoiding aggressive interventions at EOL.  Would prefer natural death.    MOLST not on file - discussed and completion pending confirmation of HCP

## 2024-04-07 NOTE — PHYSICAL EXAM
[Normal] : normal skin color and pigmentation [No Focal Deficits] : no focal deficits [Normal Affect] : the affect was normal [Normal Mood] : the mood was normal [Normal Gait] : abnormal gait [Normal Insight/Judgment] : insight and judgment were not intact [de-identified] : LISA-7 of 5 on 4/3/23  [MocaTotal] : 6 [FreeTextEntry1] : 3/27/23

## 2024-04-07 NOTE — REASON FOR VISIT
[Follow-Up] : a follow-up visit [Spouse] : spouse [Formal Caregiver] : formal caregiver [Family Member] : family member [FreeTextEntry1] : dementia, agitation [FreeTextEntry3] : meredith Taylor and VAZQUEZ Amezcua  [FreeTextEntry2] : who assists with History d/t pt with baseline memory loss

## 2024-05-18 ENCOUNTER — EMERGENCY (EMERGENCY)
Facility: HOSPITAL | Age: 89
LOS: 1 days | Discharge: NURSING, REHAB OR LONG-TERM CARE | End: 2024-05-18
Attending: EMERGENCY MEDICINE | Admitting: EMERGENCY MEDICINE
Payer: MEDICARE

## 2024-05-18 VITALS
HEART RATE: 73 BPM | SYSTOLIC BLOOD PRESSURE: 134 MMHG | TEMPERATURE: 98 F | OXYGEN SATURATION: 94 % | DIASTOLIC BLOOD PRESSURE: 77 MMHG | RESPIRATION RATE: 18 BRPM

## 2024-05-18 VITALS
DIASTOLIC BLOOD PRESSURE: 60 MMHG | HEART RATE: 66 BPM | SYSTOLIC BLOOD PRESSURE: 139 MMHG | TEMPERATURE: 98 F | RESPIRATION RATE: 16 BRPM | OXYGEN SATURATION: 97 %

## 2024-05-18 LAB
ALBUMIN SERPL ELPH-MCNC: 4.2 G/DL — SIGNIFICANT CHANGE UP (ref 3.3–5)
ALP SERPL-CCNC: 69 U/L — SIGNIFICANT CHANGE UP (ref 40–120)
ALT FLD-CCNC: 19 U/L — SIGNIFICANT CHANGE UP (ref 4–33)
ANION GAP SERPL CALC-SCNC: 14 MMOL/L — SIGNIFICANT CHANGE UP (ref 7–14)
APPEARANCE UR: CLEAR — SIGNIFICANT CHANGE UP
AST SERPL-CCNC: 17 U/L — SIGNIFICANT CHANGE UP (ref 4–32)
BACTERIA # UR AUTO: ABNORMAL /HPF
BASE EXCESS BLDV CALC-SCNC: -0.5 MMOL/L — SIGNIFICANT CHANGE UP (ref -2–3)
BASOPHILS # BLD AUTO: 0.01 K/UL — SIGNIFICANT CHANGE UP (ref 0–0.2)
BASOPHILS NFR BLD AUTO: 0.2 % — SIGNIFICANT CHANGE UP (ref 0–2)
BILIRUB SERPL-MCNC: <0.2 MG/DL — SIGNIFICANT CHANGE UP (ref 0.2–1.2)
BILIRUB UR-MCNC: NEGATIVE — SIGNIFICANT CHANGE UP
BLOOD GAS VENOUS COMPREHENSIVE RESULT: SIGNIFICANT CHANGE UP
BUN SERPL-MCNC: 38 MG/DL — HIGH (ref 7–23)
CALCIUM SERPL-MCNC: 9 MG/DL — SIGNIFICANT CHANGE UP (ref 8.4–10.5)
CAST: 0 /LPF — SIGNIFICANT CHANGE UP (ref 0–4)
CHLORIDE BLDV-SCNC: 109 MMOL/L — HIGH (ref 96–108)
CHLORIDE SERPL-SCNC: 106 MMOL/L — SIGNIFICANT CHANGE UP (ref 98–107)
CO2 BLDV-SCNC: 25.4 MMOL/L — SIGNIFICANT CHANGE UP (ref 22–26)
CO2 SERPL-SCNC: 21 MMOL/L — LOW (ref 22–31)
COLOR SPEC: YELLOW — SIGNIFICANT CHANGE UP
CREAT SERPL-MCNC: 1.52 MG/DL — HIGH (ref 0.5–1.3)
DIFF PNL FLD: NEGATIVE — SIGNIFICANT CHANGE UP
EGFR: 33 ML/MIN/1.73M2 — LOW
EOSINOPHIL # BLD AUTO: 0.06 K/UL — SIGNIFICANT CHANGE UP (ref 0–0.5)
EOSINOPHIL NFR BLD AUTO: 1.2 % — SIGNIFICANT CHANGE UP (ref 0–6)
GAS PNL BLDV: 138 MMOL/L — SIGNIFICANT CHANGE UP (ref 136–145)
GLUCOSE BLDV-MCNC: 93 MG/DL — SIGNIFICANT CHANGE UP (ref 70–99)
GLUCOSE SERPL-MCNC: 98 MG/DL — SIGNIFICANT CHANGE UP (ref 70–99)
GLUCOSE UR QL: NEGATIVE MG/DL — SIGNIFICANT CHANGE UP
HCO3 BLDV-SCNC: 24 MMOL/L — SIGNIFICANT CHANGE UP (ref 22–29)
HCT VFR BLD CALC: 29 % — LOW (ref 34.5–45)
HCT VFR BLDA CALC: 30 % — LOW (ref 34.5–46.5)
HGB BLD CALC-MCNC: 9.9 G/DL — LOW (ref 11.7–16.1)
HGB BLD-MCNC: 9.4 G/DL — LOW (ref 11.5–15.5)
IANC: 3.47 K/UL — SIGNIFICANT CHANGE UP (ref 1.8–7.4)
IMM GRANULOCYTES NFR BLD AUTO: 0.6 % — SIGNIFICANT CHANGE UP (ref 0–0.9)
KETONES UR-MCNC: NEGATIVE MG/DL — SIGNIFICANT CHANGE UP
LACTATE BLDV-MCNC: 0.7 MMOL/L — SIGNIFICANT CHANGE UP (ref 0.5–2)
LEUKOCYTE ESTERASE UR-ACNC: ABNORMAL
LYMPHOCYTES # BLD AUTO: 1.17 K/UL — SIGNIFICANT CHANGE UP (ref 1–3.3)
LYMPHOCYTES # BLD AUTO: 22.6 % — SIGNIFICANT CHANGE UP (ref 13–44)
MAGNESIUM SERPL-MCNC: 2.2 MG/DL — SIGNIFICANT CHANGE UP (ref 1.6–2.6)
MCHC RBC-ENTMCNC: 31 PG — SIGNIFICANT CHANGE UP (ref 27–34)
MCHC RBC-ENTMCNC: 32.4 GM/DL — SIGNIFICANT CHANGE UP (ref 32–36)
MCV RBC AUTO: 95.7 FL — SIGNIFICANT CHANGE UP (ref 80–100)
MONOCYTES # BLD AUTO: 0.44 K/UL — SIGNIFICANT CHANGE UP (ref 0–0.9)
MONOCYTES NFR BLD AUTO: 8.5 % — SIGNIFICANT CHANGE UP (ref 2–14)
NEUTROPHILS # BLD AUTO: 3.47 K/UL — SIGNIFICANT CHANGE UP (ref 1.8–7.4)
NEUTROPHILS NFR BLD AUTO: 66.9 % — SIGNIFICANT CHANGE UP (ref 43–77)
NITRITE UR-MCNC: POSITIVE
NRBC # BLD: 0 /100 WBCS — SIGNIFICANT CHANGE UP (ref 0–0)
NRBC # FLD: 0 K/UL — SIGNIFICANT CHANGE UP (ref 0–0)
PCO2 BLDV: 39 MMHG — SIGNIFICANT CHANGE UP (ref 39–52)
PH BLDV: 7.4 — SIGNIFICANT CHANGE UP (ref 7.32–7.43)
PH UR: 7.5 — SIGNIFICANT CHANGE UP (ref 5–8)
PHOSPHATE SERPL-MCNC: 4.1 MG/DL — SIGNIFICANT CHANGE UP (ref 2.5–4.5)
PLATELET # BLD AUTO: 260 K/UL — SIGNIFICANT CHANGE UP (ref 150–400)
PO2 BLDV: 55 MMHG — HIGH (ref 25–45)
POTASSIUM BLDV-SCNC: 4.6 MMOL/L — SIGNIFICANT CHANGE UP (ref 3.5–5.1)
POTASSIUM SERPL-MCNC: 4.5 MMOL/L — SIGNIFICANT CHANGE UP (ref 3.5–5.3)
POTASSIUM SERPL-SCNC: 4.5 MMOL/L — SIGNIFICANT CHANGE UP (ref 3.5–5.3)
PROT SERPL-MCNC: 6.7 G/DL — SIGNIFICANT CHANGE UP (ref 6–8.3)
PROT UR-MCNC: SIGNIFICANT CHANGE UP MG/DL
RBC # BLD: 3.03 M/UL — LOW (ref 3.8–5.2)
RBC # FLD: 13.8 % — SIGNIFICANT CHANGE UP (ref 10.3–14.5)
RBC CASTS # UR COMP ASSIST: 1 /HPF — SIGNIFICANT CHANGE UP (ref 0–4)
SAO2 % BLDV: 87.4 % — SIGNIFICANT CHANGE UP (ref 67–88)
SODIUM SERPL-SCNC: 141 MMOL/L — SIGNIFICANT CHANGE UP (ref 135–145)
SP GR SPEC: 1.03 — SIGNIFICANT CHANGE UP (ref 1–1.03)
SQUAMOUS # UR AUTO: 1 /HPF — SIGNIFICANT CHANGE UP (ref 0–5)
UROBILINOGEN FLD QL: 0.2 MG/DL — SIGNIFICANT CHANGE UP (ref 0.2–1)
WBC # BLD: 5.18 K/UL — SIGNIFICANT CHANGE UP (ref 3.8–10.5)
WBC # FLD AUTO: 5.18 K/UL — SIGNIFICANT CHANGE UP (ref 3.8–10.5)
WBC UR QL: 18 /HPF — HIGH (ref 0–5)

## 2024-05-18 PROCEDURE — 71045 X-RAY EXAM CHEST 1 VIEW: CPT | Mod: 26

## 2024-05-18 PROCEDURE — 74177 CT ABD & PELVIS W/CONTRAST: CPT | Mod: 26,MC

## 2024-05-18 PROCEDURE — 99284 EMERGENCY DEPT VISIT MOD MDM: CPT

## 2024-05-18 RX ORDER — CEFTRIAXONE 500 MG/1
1000 INJECTION, POWDER, FOR SOLUTION INTRAMUSCULAR; INTRAVENOUS ONCE
Refills: 0 | Status: COMPLETED | OUTPATIENT
Start: 2024-05-18 | End: 2024-05-18

## 2024-05-18 RX ORDER — CEFPODOXIME PROXETIL 100 MG
1 TABLET ORAL
Qty: 20 | Refills: 0
Start: 2024-05-18 | End: 2024-05-27

## 2024-05-18 RX ADMIN — CEFTRIAXONE 100 MILLIGRAM(S): 500 INJECTION, POWDER, FOR SOLUTION INTRAMUSCULAR; INTRAVENOUS at 23:51

## 2024-05-18 NOTE — ED ADULT NURSE NOTE - OBJECTIVE STATEMENT
Radha RN: Pt arrives to ED spot 10A A&Ox1 to self, pt states they are in the ED due to RLQ abd pain earlier today but as per triage note they were having aggressive behavior while at the assisted living facility. Pt has hx of dementia. Pt is calm and cooperative at this time. Abd is nondistended and soft upon palpation. Pt denies urinary symptoms and no difficulty with bowel movements. Pt states they do not have the pain at this time. 20G placed to RAC, labs drawn and sent. Report given to primary RN

## 2024-05-18 NOTE — ED PROVIDER NOTE - ATTENDING CONTRIBUTION TO CARE
Brief HPI:  88-year-old female past medical history of dementia with behavioral disturbance, polycythemia vera, presents from assisted living facility for aggression towards staff and other residents.  During my interview patient is calm and cooperative.  Reports abdominal pain, however is unable to Further characterize and appears confused.  Does not know why she is in the emergency department.    Vitals:   Reviewed    Exam:    GEN:  Non-toxic appearing, non-distressed, speaking full sentences, non-diaphoretic, Alert, oriented to name  HEENT:  NCAT, neck supple, EOMI, PERRLA, sclera anicteric, no conjunctival pallor or injection, no stridor, normal voice, no tonsillar exudate, uvula midline  CV:  regular rhythm and rate, s1/s2 audible, no murmurs, rubs or gallops, peripheral pulses 2+ and symmetric  PULM:  non-labored respirations, lungs clear to auscultation bilaterally, no wheezes, crackles or rales  ABD:  non distended, non-tender, no rebound, no guarding, negative Smith's sign, bowel sounds normal, no cvat  MSK:  no gross deformity, non-tender extremities and joints, range of motion grossly normal appearing, no extremity edema, extremities warm and well perfused   NEURO: CN II-XII intact, motor 5/5 in upper and lower extremities bilaterally, sensation grossly intact in extremities and trunk, finger to nose testing wnl, no nystagmus, negative Romberg, no pronator drift  SKIN:  warm, dry, no rash or vesicles     A/P:  88-year-old female past medical history of dementia with behavioral disturbance, polycythemia vera, presents from assisted living facility for aggression towards staff and other residents. Vital signs stable.  Reports abdominal pain, although there is limited HPI due to likely dementia.  Abdomen nontender on exam.  Will send labs, urine studies, CT abdomen pelvis.  Disposition pending.

## 2024-05-18 NOTE — ED PROVIDER NOTE - PATIENT PORTAL LINK FT
You can access the FollowMyHealth Patient Portal offered by [facility] by registering at the following website: [email or web address]. By joining [facility]’s FollowMyHealth portal, you will also be able to view your health information using other applications (apps) compatible with our system.

## 2024-05-18 NOTE — ED ADULT NURSE REASSESSMENT NOTE - NS ED NURSE REASSESS COMMENT FT1
Pt changed into hospital gown, skin clean/dry/intact. Pt placed on bed pan for urine sample, collected and sent. Pt calm, offers no complaints at this time. respirations even and unlabored. Safety maintained, bed locked in lowest position.

## 2024-05-18 NOTE — ED PROVIDER NOTE - CLINICAL SUMMARY MEDICAL DECISION MAKING FREE TEXT BOX
Amari, PGY3 - 88-year-old woman with PMH dementia with behavioral disturbances, presenting from assisted living due to agitation, aggression towards staff and other residents.  Patient calm at this time, endorsing only headache, questionable abdominal pain on exam, labs, CT, UA, reassess.  If no infectious etiology is found, most likely to be returning back to assisted living for further management. *The above represents an initial assessment/impression. Please refer to progress notes for potential changes in patient clinical course*

## 2024-05-18 NOTE — PROVIDER CONTACT NOTE (OTHER) - SITUATION
Notified by provider that pt is ready for d/c; needs transportation back to assisted living facility.

## 2024-05-18 NOTE — ED ADULT NURSE NOTE - MODE OF DISCHARGE
Patient discharged on 3/28 with a creatinine of 1.6. Today, creatinine is 2.9    Consult Nephrology     Stretcher

## 2024-05-18 NOTE — ED PROVIDER NOTE - NSFOLLOWUPINSTRUCTIONS_ED_ALL_ED_FT
You were seen in the Emergency Room today because of agitation. You have a urinary tract infection.     A copy of your results is included in your discharge paperwork.  Please follow-up with your Primary Care Physician within the week.     We have sent medication to your Pharmacy. It is called Cefpodoxime. This is an antibiotic.       WHAT YOU NEED TO KNOW:    A urinary tract infection (UTI) is caused by bacteria that get inside your urinary tract. Most bacteria that enter your urinary tract come out when you urinate. If the bacteria stay in your urinary tract, you may get an infection. Your urinary tract includes your kidneys, ureters, bladder, and urethra. Urine is made in your kidneys, and it flows from the ureters to the bladder. Urine leaves the bladder through the urethra. A UTI is more common in your lower urinary tract, which includes your bladder and urethra.       DISCHARGE INSTRUCTIONS:    Return to the emergency department if:   •You are urinating very little or not at all.  •You have a high fever with shaking chills.   •You have side or back pain that gets worse.    Contact your healthcare provider if:   •You have a mild fever.  •You do not feel better after 2 days of taking antibiotics.  •You are vomiting.   •You have new symptoms, such as blood or pus in your urine.   •You have questions or concerns about your condition or care.      Prevent another UTI:   •Empty your bladder often. Urinate and empty your bladder as soon as you feel the need. Do not hold your urine for long periods of time.  •Drink liquids as directed. Ask how much liquid to drink each day and which liquids are best for you. You may need to drink more liquids than usual to help flush out the bacteria. Do not drink alcohol, caffeine, or citrus juices. These can irritate your bladder and increase your symptoms. Your healthcare provider may recommend cranberry juice to help prevent a UTI.  •Urinate after you have sex. This can help flush out bacteria passed during sex.  •Do pelvic muscle exercises often. Pelvic muscle exercises may help you start and stop urinating. Strong pelvic muscles may help you empty your bladder easier. Squeeze these muscles tightly for 5 seconds like you are trying to hold back urine. Then relax for 5 seconds. Gradually work up to squeezing for 10 seconds. Do 3 sets of 15 repetitions a day, or as directed.      Follow up with your doctor as directed: Write down your questions so you remember to ask them during your visits.

## 2024-05-18 NOTE — ED ADULT NURSE NOTE - NSFALLHARMRISKINTERV_ED_ALL_ED

## 2024-05-18 NOTE — ED PROVIDER NOTE - PROGRESS NOTE DETAILS
Amari, PGY3 - Spoke with daughter Brit [phone], states that her mother has had aggressive outburst in the past but never this bad. There is a psychiatrist at the [facility] who has been changing Zoloft, sertraline medications, will continue to follow her. +UTI. patient to get antibiotics and be transported back to [facility]. Patient stable for discharge. Understands the Emergency Room work-up and discharge precautions. Will follow-up with pcp. tried to contact facility multiple times, no response. SW to set up transport back.

## 2024-05-18 NOTE — ED PROVIDER NOTE - OBJECTIVE STATEMENT
88-year-old woman with PMH dementia with behavioral disturbances, polycythemia vera, hyperglycemia, chronic tension-type headache, presenting from her assisted living facility [facility] due to aggression towards staff and other residents.  Patient at this time is calm and cooperative, denies any symptoms, states that she does not know why she is in the emergency room at this time. Per paperwork patient is DNR however the complete MOST form was not photocopied and sent with patient.

## 2024-05-18 NOTE — PROVIDER CONTACT NOTE (OTHER) - ASSESSMENT
Pt is from the [facility]; needs ambulance transportation back to facility due to dementia.

## 2024-05-18 NOTE — ED ADULT TRIAGE NOTE - BP NONINVASIVE DIASTOLIC (MM HG)
77 Eye Protection Verbiage: Before proceeding with the stage, a plastic scleral shield was inserted. The globe was anesthetized with a few drops of 1% lidocaine with 1:100,000 epinephrine. Then, an appropriate sized scleral shield was chosen and coated with lacrilube ointment. The shield was gently inserted and left in place for the duration of each stage. After the stage was completed, the shield was gently removed.

## 2024-05-18 NOTE — ED PROVIDER NOTE - PHYSICAL EXAMINATION
Gen: NAD, AOx1, able to make needs known, non-toxic  Head: NCAT  HEENT: EOMI, normal conjunctiva  Lung: CTAB, no respiratory distress, no wheezes/rhonchi/rales B/L, speaking in full sentences  CV: RRR, no M/R/G, pulses bilaterally   Abd: soft, ?LLQ tenderness, no guarding, no CVA tenderness  MSK: no visible bony deformities  Neuro: No focal sensory or motor deficits  Skin: Warm, well perfused, no rash  Psych: normal affect

## 2024-05-19 RX ORDER — CEFPODOXIME PROXETIL 100 MG
1 TABLET ORAL
Qty: 20 | Refills: 0
Start: 2024-05-19 | End: 2024-05-28

## 2024-05-22 LAB
-  AMOXICILLIN/CLAVULANIC ACID: SIGNIFICANT CHANGE UP
-  AMPICILLIN/SULBACTAM: SIGNIFICANT CHANGE UP
-  AMPICILLIN: SIGNIFICANT CHANGE UP
-  AZTREONAM: SIGNIFICANT CHANGE UP
-  CEFAZOLIN: SIGNIFICANT CHANGE UP
-  CEFEPIME: SIGNIFICANT CHANGE UP
-  CEFOXITIN: SIGNIFICANT CHANGE UP
-  CEFTRIAXONE: SIGNIFICANT CHANGE UP
-  CEFUROXIME: SIGNIFICANT CHANGE UP
-  CIPROFLOXACIN: SIGNIFICANT CHANGE UP
-  ERTAPENEM: SIGNIFICANT CHANGE UP
-  GENTAMICIN: SIGNIFICANT CHANGE UP
-  IMIPENEM: SIGNIFICANT CHANGE UP
-  LEVOFLOXACIN: SIGNIFICANT CHANGE UP
-  MEROPENEM: SIGNIFICANT CHANGE UP
-  NITROFURANTOIN: SIGNIFICANT CHANGE UP
-  PIPERACILLIN/TAZOBACTAM: SIGNIFICANT CHANGE UP
-  TOBRAMYCIN: SIGNIFICANT CHANGE UP
-  TRIMETHOPRIM/SULFAMETHOXAZOLE: SIGNIFICANT CHANGE UP
CULTURE RESULTS: ABNORMAL
METHOD TYPE: SIGNIFICANT CHANGE UP
ORGANISM # SPEC MICROSCOPIC CNT: ABNORMAL
ORGANISM # SPEC MICROSCOPIC CNT: ABNORMAL
SPECIMEN SOURCE: SIGNIFICANT CHANGE UP

## 2024-07-14 ENCOUNTER — EMERGENCY (EMERGENCY)
Facility: HOSPITAL | Age: 89
LOS: 1 days | Discharge: HOME | End: 2024-07-14
Attending: EMERGENCY MEDICINE | Admitting: EMERGENCY MEDICINE
Payer: MEDICARE

## 2024-07-14 VITALS
OXYGEN SATURATION: 100 % | DIASTOLIC BLOOD PRESSURE: 71 MMHG | HEIGHT: 60 IN | TEMPERATURE: 98 F | RESPIRATION RATE: 16 BRPM | HEART RATE: 79 BPM | SYSTOLIC BLOOD PRESSURE: 120 MMHG | WEIGHT: 134.92 LBS

## 2024-07-14 VITALS
SYSTOLIC BLOOD PRESSURE: 103 MMHG | TEMPERATURE: 98 F | RESPIRATION RATE: 18 BRPM | DIASTOLIC BLOOD PRESSURE: 67 MMHG | OXYGEN SATURATION: 96 % | HEART RATE: 69 BPM

## 2024-07-14 LAB
ALBUMIN SERPL ELPH-MCNC: 4 G/DL — SIGNIFICANT CHANGE UP (ref 3.3–5)
ALP SERPL-CCNC: 75 U/L — SIGNIFICANT CHANGE UP (ref 40–120)
ALT FLD-CCNC: 13 U/L — SIGNIFICANT CHANGE UP (ref 4–33)
ANION GAP SERPL CALC-SCNC: 12 MMOL/L — SIGNIFICANT CHANGE UP (ref 7–14)
APTT BLD: 28.5 SEC — SIGNIFICANT CHANGE UP (ref 24.5–35.6)
AST SERPL-CCNC: 20 U/L — SIGNIFICANT CHANGE UP (ref 4–32)
BASE EXCESS BLDV CALC-SCNC: -0.7 MMOL/L — SIGNIFICANT CHANGE UP (ref -2–3)
BASOPHILS # BLD AUTO: 0.01 K/UL — SIGNIFICANT CHANGE UP (ref 0–0.2)
BASOPHILS NFR BLD AUTO: 0.2 % — SIGNIFICANT CHANGE UP (ref 0–2)
BILIRUB SERPL-MCNC: <0.2 MG/DL — SIGNIFICANT CHANGE UP (ref 0.2–1.2)
BUN SERPL-MCNC: 33 MG/DL — HIGH (ref 7–23)
CA-I SERPL-SCNC: 1.24 MMOL/L — SIGNIFICANT CHANGE UP (ref 1.15–1.33)
CALCIUM SERPL-MCNC: 9.2 MG/DL — SIGNIFICANT CHANGE UP (ref 8.4–10.5)
CHLORIDE BLDV-SCNC: 107 MMOL/L — SIGNIFICANT CHANGE UP (ref 96–108)
CHLORIDE SERPL-SCNC: 104 MMOL/L — SIGNIFICANT CHANGE UP (ref 98–107)
CO2 BLDV-SCNC: 25.4 MMOL/L — SIGNIFICANT CHANGE UP (ref 22–26)
CO2 SERPL-SCNC: 22 MMOL/L — SIGNIFICANT CHANGE UP (ref 22–31)
CREAT SERPL-MCNC: 1.51 MG/DL — HIGH (ref 0.5–1.3)
EGFR: 33 ML/MIN/1.73M2 — LOW
EOSINOPHIL # BLD AUTO: 0.04 K/UL — SIGNIFICANT CHANGE UP (ref 0–0.5)
EOSINOPHIL NFR BLD AUTO: 0.9 % — SIGNIFICANT CHANGE UP (ref 0–6)
FLUAV AG NPH QL: SIGNIFICANT CHANGE UP
FLUBV AG NPH QL: SIGNIFICANT CHANGE UP
GAS PNL BLDV: 137 MMOL/L — SIGNIFICANT CHANGE UP (ref 136–145)
GAS PNL BLDV: SIGNIFICANT CHANGE UP
GLUCOSE BLDV-MCNC: 126 MG/DL — HIGH (ref 70–99)
GLUCOSE SERPL-MCNC: 126 MG/DL — HIGH (ref 70–99)
HCO3 BLDV-SCNC: 24 MMOL/L — SIGNIFICANT CHANGE UP (ref 22–29)
HCT VFR BLD CALC: 29.5 % — LOW (ref 34.5–45)
HCT VFR BLDA CALC: 29 % — LOW (ref 34.5–46.5)
HGB BLD CALC-MCNC: 9.5 G/DL — LOW (ref 11.7–16.1)
HGB BLD-MCNC: 9.5 G/DL — LOW (ref 11.5–15.5)
HIV 1+2 AB+HIV1 P24 AG SERPL QL IA: SIGNIFICANT CHANGE UP
IANC: 2.79 K/UL — SIGNIFICANT CHANGE UP (ref 1.8–7.4)
IMM GRANULOCYTES NFR BLD AUTO: 0.2 % — SIGNIFICANT CHANGE UP (ref 0–0.9)
INR BLD: 1.05 RATIO — SIGNIFICANT CHANGE UP (ref 0.85–1.18)
LACTATE BLDV-MCNC: 1.1 MMOL/L — SIGNIFICANT CHANGE UP (ref 0.5–2)
LYMPHOCYTES # BLD AUTO: 1.08 K/UL — SIGNIFICANT CHANGE UP (ref 1–3.3)
LYMPHOCYTES # BLD AUTO: 25.2 % — SIGNIFICANT CHANGE UP (ref 13–44)
MAGNESIUM SERPL-MCNC: 2.3 MG/DL — SIGNIFICANT CHANGE UP (ref 1.6–2.6)
MCHC RBC-ENTMCNC: 30.4 PG — SIGNIFICANT CHANGE UP (ref 27–34)
MCHC RBC-ENTMCNC: 32.2 GM/DL — SIGNIFICANT CHANGE UP (ref 32–36)
MCV RBC AUTO: 94.2 FL — SIGNIFICANT CHANGE UP (ref 80–100)
MONOCYTES # BLD AUTO: 0.35 K/UL — SIGNIFICANT CHANGE UP (ref 0–0.9)
MONOCYTES NFR BLD AUTO: 8.2 % — SIGNIFICANT CHANGE UP (ref 2–14)
NEUTROPHILS # BLD AUTO: 2.79 K/UL — SIGNIFICANT CHANGE UP (ref 1.8–7.4)
NEUTROPHILS NFR BLD AUTO: 65.3 % — SIGNIFICANT CHANGE UP (ref 43–77)
NRBC # BLD: 0 /100 WBCS — SIGNIFICANT CHANGE UP (ref 0–0)
NRBC # FLD: 0 K/UL — SIGNIFICANT CHANGE UP (ref 0–0)
PCO2 BLDV: 40 MMHG — SIGNIFICANT CHANGE UP (ref 39–52)
PH BLDV: 7.39 — SIGNIFICANT CHANGE UP (ref 7.32–7.43)
PHOSPHATE SERPL-MCNC: 3.7 MG/DL — SIGNIFICANT CHANGE UP (ref 2.5–4.5)
PLATELET # BLD AUTO: 267 K/UL — SIGNIFICANT CHANGE UP (ref 150–400)
PO2 BLDV: 53 MMHG — HIGH (ref 25–45)
POTASSIUM BLDV-SCNC: 3.7 MMOL/L — SIGNIFICANT CHANGE UP (ref 3.5–5.1)
POTASSIUM SERPL-MCNC: 3.8 MMOL/L — SIGNIFICANT CHANGE UP (ref 3.5–5.3)
POTASSIUM SERPL-SCNC: 3.8 MMOL/L — SIGNIFICANT CHANGE UP (ref 3.5–5.3)
PROT SERPL-MCNC: 6.9 G/DL — SIGNIFICANT CHANGE UP (ref 6–8.3)
PROTHROM AB SERPL-ACNC: 11.7 SEC — SIGNIFICANT CHANGE UP (ref 9.5–13)
RBC # BLD: 3.13 M/UL — LOW (ref 3.8–5.2)
RBC # FLD: 13 % — SIGNIFICANT CHANGE UP (ref 10.3–14.5)
RSV RNA NPH QL NAA+NON-PROBE: SIGNIFICANT CHANGE UP
SAO2 % BLDV: 83.3 % — SIGNIFICANT CHANGE UP (ref 67–88)
SARS-COV-2 RNA SPEC QL NAA+PROBE: SIGNIFICANT CHANGE UP
SODIUM SERPL-SCNC: 138 MMOL/L — SIGNIFICANT CHANGE UP (ref 135–145)
TSH SERPL-MCNC: 1.38 UIU/ML — SIGNIFICANT CHANGE UP (ref 0.27–4.2)
WBC # BLD: 4.28 K/UL — SIGNIFICANT CHANGE UP (ref 3.8–10.5)
WBC # FLD AUTO: 4.28 K/UL — SIGNIFICANT CHANGE UP (ref 3.8–10.5)

## 2024-07-14 PROCEDURE — 93010 ELECTROCARDIOGRAM REPORT: CPT

## 2024-07-14 PROCEDURE — 99284 EMERGENCY DEPT VISIT MOD MDM: CPT

## 2024-07-14 RX ORDER — DEXTROSE MONOHYDRATE AND SODIUM CHLORIDE 5; .3 G/100ML; G/100ML
500 INJECTION, SOLUTION INTRAVENOUS ONCE
Refills: 0 | Status: COMPLETED | OUTPATIENT
Start: 2024-07-14 | End: 2024-07-14

## 2024-07-14 RX ORDER — OLANZAPINE 2.5 MG/1
5 TABLET, FILM COATED ORAL ONCE
Refills: 0 | Status: COMPLETED | OUTPATIENT
Start: 2024-07-14 | End: 2024-07-14

## 2024-07-14 RX ADMIN — OLANZAPINE 5 MILLIGRAM(S): 2.5 TABLET, FILM COATED ORAL at 22:49

## 2024-07-14 RX ADMIN — DEXTROSE MONOHYDRATE AND SODIUM CHLORIDE 500 MILLILITER(S): 5; .3 INJECTION, SOLUTION INTRAVENOUS at 23:12

## 2024-07-15 PROBLEM — D45 POLYCYTHEMIA VERA: Chronic | Status: ACTIVE | Noted: 2024-05-18

## 2024-07-15 PROBLEM — G44.209 TENSION-TYPE HEADACHE, UNSPECIFIED, NOT INTRACTABLE: Chronic | Status: ACTIVE | Noted: 2024-05-18

## 2024-07-15 PROBLEM — F03.90 UNSPECIFIED DEMENTIA, UNSPECIFIED SEVERITY, WITHOUT BEHAVIORAL DISTURBANCE, PSYCHOTIC DISTURBANCE, MOOD DISTURBANCE, AND ANXIETY: Chronic | Status: ACTIVE | Noted: 2024-05-18

## 2024-07-15 LAB
APPEARANCE UR: CLEAR — SIGNIFICANT CHANGE UP
BACTERIA # UR AUTO: NEGATIVE /HPF — SIGNIFICANT CHANGE UP
BILIRUB UR-MCNC: NEGATIVE — SIGNIFICANT CHANGE UP
CAST: 0 /LPF — SIGNIFICANT CHANGE UP (ref 0–4)
COLOR SPEC: YELLOW — SIGNIFICANT CHANGE UP
DIFF PNL FLD: NEGATIVE — SIGNIFICANT CHANGE UP
GLUCOSE UR QL: NEGATIVE MG/DL — SIGNIFICANT CHANGE UP
KETONES UR-MCNC: NEGATIVE MG/DL — SIGNIFICANT CHANGE UP
LEUKOCYTE ESTERASE UR-ACNC: NEGATIVE — SIGNIFICANT CHANGE UP
NITRITE UR-MCNC: NEGATIVE — SIGNIFICANT CHANGE UP
PH UR: 6.5 — SIGNIFICANT CHANGE UP (ref 5–8)
PROT UR-MCNC: NEGATIVE MG/DL — SIGNIFICANT CHANGE UP
RBC CASTS # UR COMP ASSIST: 0 /HPF — SIGNIFICANT CHANGE UP (ref 0–4)
SP GR SPEC: 1.01 — SIGNIFICANT CHANGE UP (ref 1–1.03)
SQUAMOUS # UR AUTO: 0 /HPF — SIGNIFICANT CHANGE UP (ref 0–5)
UROBILINOGEN FLD QL: 0.2 MG/DL — SIGNIFICANT CHANGE UP (ref 0.2–1)
WBC UR QL: 1 /HPF — SIGNIFICANT CHANGE UP (ref 0–5)

## 2024-07-15 RX ORDER — OLANZAPINE 2.5 MG/1
5 TABLET, FILM COATED ORAL ONCE
Refills: 0 | Status: COMPLETED | OUTPATIENT
Start: 2024-07-15 | End: 2024-07-15

## 2024-07-15 RX ADMIN — OLANZAPINE 5 MILLIGRAM(S): 2.5 TABLET, FILM COATED ORAL at 02:36

## 2024-07-16 LAB
CULTURE RESULTS: SIGNIFICANT CHANGE UP
SPECIMEN SOURCE: SIGNIFICANT CHANGE UP

## 2024-08-05 ENCOUNTER — APPOINTMENT (OUTPATIENT)
Dept: GERIATRICS | Facility: CLINIC | Age: 89
End: 2024-08-05

## 2024-08-05 PROCEDURE — 99215 OFFICE O/P EST HI 40 MIN: CPT

## 2024-08-05 NOTE — REASON FOR VISIT
[Follow-Up] : a follow-up visit [Family Member] : family member [FreeTextEntry1] : dementia, agitation [FreeTextEntry3] : meredith Perea (from [place]) [phone] [FreeTextEntry2] : who assists with History d/t pt with baseline memory loss

## 2024-08-05 NOTE — ASSESSMENT
[FreeTextEntry1] : - Medications reviewed and discussed - Continue with same for now, will consider readjustment but hold off for now considering changing environment starting today - Safety precautions - Behavioral interventions  - Discussed r/b/a of using antipsychotic medications e.g Quetiapine for severe agitation note amenable to behavioral interventions.  We discussed FDA black box warning of increased risk of stroke, myocardial infarction, EPS/parkinsonism, sedation, falls, and death, among other potential side effects. After discussion we decide that benefits outweigh the risks for patient and caregiver safety and quality of life.  c/w Seroquel 100mg QHS for psychosis and aggressive behavior not amenable to behavioral interventions c/w Zoloft 75mg daily c/w Trazodone 25mg QHS for now  - f/u ACP documents to review and further discuss GOC/MOLST completion   Repeat BMP ordered   f/u with me in 1 mo, unless earlier PRN  Not eligible for GUIDE  Rest as per PMD

## 2024-08-05 NOTE — HISTORY OF PRESENT ILLNESS
[Patient reported hearing was abnormal] : Patient reported hearing was abnormal [No falls in past year] : Patient reported no falls in the past year [Independent] : transferring/mobility [Completely Dependent] : Completely dependent. [Full assistance needed] : Assistance needed managing medications [FAST Score: ____] : Functional Assessment Scale (FAST) Score: [unfilled] [Smoke Detector] : smoke detector [Carbon Monoxide Detector] : carbon monoxide detector [Medical reason not done] : Medical reason not done [I have developed a follow-up plan documented below in the note.] : I have developed a follow-up plan documented below in the note. [Moderate] : Stage: Moderate [Stable] : Status: Stable [Memory Lapses Or Loss] : stable memory impairment [Patient Observed To Be Agitated] : stable agitation [Hostility Toward Caregivers] : stable aggression [Sleep Disturbances] : denies sleep disturbances [] : denies wandering [Fixed Beliefs Contradicted By Reality (Delusions)] : stable delusions [Difficulty Finding Desired Words] : stable difficulty finding desired words [With Patient/Caregiver] : , with patient/caregiver [PapSmeardate] : 5/22   [TextBox_31] : Seen by ENT Dr. Peterson in 5/22 - cleared for HAs [BoneDensityDate] : 2021 [TextBox_37] : s/p cataract sx, last ophtho, wears reading glasses  [Driving Concerns] : not driving or driving without noted concerns [[facility]] : >12  [FreeTextEntry1] : needs prompting/encouragement/reminders to bathe [de-identified] : sometimes cooks too much food, "always burned pots on the stove", disorganized [de-identified] :  helps  [de-identified] :  helps  [FreeTextEntry6] : stopped driving in 2018 b/c  was worried about her driving [FreeTextEntry7] :  manages and reminds  [de-identified] :  took over approx 2019  [de-identified] : stopped driving in 2018 b/c  was worried about her driving  [de-identified] : Baseline dementia, on Quetiapine [AdvancecareDate] : 4/4/24 [FreeTextEntry4] : HCP form not on file -  is reportedly primary, and dtr Doreen is alternate? Family to bring forms to next visit   Per prior discussion w/  - what matters most: family, avoiding aggressive interventions at EOL.  Would prefer natural death.    MOLST not on file - discussed and completion pending confirmation of HCP

## 2024-08-05 NOTE — PHYSICAL EXAM
[Normal] : normal skin color and pigmentation [No Focal Deficits] : no focal deficits [Normal Affect] : the affect was normal [Normal Mood] : the mood was normal [Normal Gait] : abnormal gait [Normal Insight/Judgment] : insight and judgment were not intact [de-identified] : anxious, restless [MocaTotal] : 6 [FreeTextEntry1] : 3/27/23

## 2024-08-07 PROBLEM — E86.9: Status: ACTIVE | Noted: 2024-08-07

## 2024-08-10 ENCOUNTER — EMERGENCY (EMERGENCY)
Facility: HOSPITAL | Age: 89
LOS: 1 days | Discharge: HOME | End: 2024-08-10
Attending: EMERGENCY MEDICINE | Admitting: EMERGENCY MEDICINE
Payer: MEDICARE

## 2024-08-10 VITALS
RESPIRATION RATE: 17 BRPM | SYSTOLIC BLOOD PRESSURE: 116 MMHG | DIASTOLIC BLOOD PRESSURE: 69 MMHG | WEIGHT: 137.35 LBS | OXYGEN SATURATION: 95 % | HEART RATE: 83 BPM | TEMPERATURE: 99 F | HEIGHT: 60 IN

## 2024-08-10 VITALS
HEART RATE: 78 BPM | DIASTOLIC BLOOD PRESSURE: 77 MMHG | SYSTOLIC BLOOD PRESSURE: 168 MMHG | RESPIRATION RATE: 17 BRPM | TEMPERATURE: 98 F | OXYGEN SATURATION: 94 %

## 2024-08-10 LAB
ALBUMIN SERPL ELPH-MCNC: 4 G/DL — SIGNIFICANT CHANGE UP (ref 3.3–5)
ALP SERPL-CCNC: 86 U/L — SIGNIFICANT CHANGE UP (ref 30–120)
ALT FLD-CCNC: 11 U/L — SIGNIFICANT CHANGE UP (ref 10–60)
ANION GAP SERPL CALC-SCNC: 8 MMOL/L — SIGNIFICANT CHANGE UP (ref 5–17)
APTT BLD: 28.4 SEC — SIGNIFICANT CHANGE UP (ref 24.5–35.6)
AST SERPL-CCNC: 5 U/L — LOW (ref 10–40)
BASOPHILS # BLD AUTO: 0.02 K/UL — SIGNIFICANT CHANGE UP (ref 0–0.2)
BASOPHILS NFR BLD AUTO: 0.3 % — SIGNIFICANT CHANGE UP (ref 0–2)
BILIRUB SERPL-MCNC: 0.3 MG/DL — SIGNIFICANT CHANGE UP (ref 0.2–1.2)
BUN SERPL-MCNC: 37 MG/DL — HIGH (ref 7–23)
CALCIUM SERPL-MCNC: 9.3 MG/DL — SIGNIFICANT CHANGE UP (ref 8.4–10.5)
CHLORIDE SERPL-SCNC: 107 MMOL/L — SIGNIFICANT CHANGE UP (ref 96–108)
CO2 SERPL-SCNC: 24 MMOL/L — SIGNIFICANT CHANGE UP (ref 22–31)
CREAT SERPL-MCNC: 1.77 MG/DL — HIGH (ref 0.5–1.3)
EGFR: 27 ML/MIN/1.73M2 — LOW
EOSINOPHIL # BLD AUTO: 0.02 K/UL — SIGNIFICANT CHANGE UP (ref 0–0.5)
EOSINOPHIL NFR BLD AUTO: 0.3 % — SIGNIFICANT CHANGE UP (ref 0–6)
FLUAV AG NPH QL: SIGNIFICANT CHANGE UP
FLUBV AG NPH QL: SIGNIFICANT CHANGE UP
GLUCOSE SERPL-MCNC: 119 MG/DL — HIGH (ref 70–99)
HCT VFR BLD CALC: 31 % — LOW (ref 34.5–45)
HGB BLD-MCNC: 10.2 G/DL — LOW (ref 11.5–15.5)
IMM GRANULOCYTES NFR BLD AUTO: 0.4 % — SIGNIFICANT CHANGE UP (ref 0–0.9)
INR BLD: 1.08 RATIO — SIGNIFICANT CHANGE UP (ref 0.85–1.18)
LYMPHOCYTES # BLD AUTO: 0.79 K/UL — LOW (ref 1–3.3)
LYMPHOCYTES # BLD AUTO: 11.4 % — LOW (ref 13–44)
MCHC RBC-ENTMCNC: 30.8 PG — SIGNIFICANT CHANGE UP (ref 27–34)
MCHC RBC-ENTMCNC: 32.9 GM/DL — SIGNIFICANT CHANGE UP (ref 32–36)
MCV RBC AUTO: 93.7 FL — SIGNIFICANT CHANGE UP (ref 80–100)
MONOCYTES # BLD AUTO: 0.35 K/UL — SIGNIFICANT CHANGE UP (ref 0–0.9)
MONOCYTES NFR BLD AUTO: 5.1 % — SIGNIFICANT CHANGE UP (ref 2–14)
NEUTROPHILS # BLD AUTO: 5.7 K/UL — SIGNIFICANT CHANGE UP (ref 1.8–7.4)
NEUTROPHILS NFR BLD AUTO: 82.5 % — HIGH (ref 43–77)
NRBC # BLD: 0 /100 WBCS — SIGNIFICANT CHANGE UP (ref 0–0)
PLATELET # BLD AUTO: 323 K/UL — SIGNIFICANT CHANGE UP (ref 150–400)
POTASSIUM SERPL-MCNC: 4.4 MMOL/L — SIGNIFICANT CHANGE UP (ref 3.5–5.3)
POTASSIUM SERPL-SCNC: 4.4 MMOL/L — SIGNIFICANT CHANGE UP (ref 3.5–5.3)
PROT SERPL-MCNC: 7.6 G/DL — SIGNIFICANT CHANGE UP (ref 6–8.3)
PROTHROM AB SERPL-ACNC: 12.1 SEC — SIGNIFICANT CHANGE UP (ref 9.5–13)
RBC # BLD: 3.31 M/UL — LOW (ref 3.8–5.2)
RBC # FLD: 13.3 % — SIGNIFICANT CHANGE UP (ref 10.3–14.5)
RSV RNA NPH QL NAA+NON-PROBE: SIGNIFICANT CHANGE UP
SARS-COV-2 RNA SPEC QL NAA+PROBE: SIGNIFICANT CHANGE UP
SODIUM SERPL-SCNC: 139 MMOL/L — SIGNIFICANT CHANGE UP (ref 135–145)
WBC # BLD: 6.91 K/UL — SIGNIFICANT CHANGE UP (ref 3.8–10.5)
WBC # FLD AUTO: 6.91 K/UL — SIGNIFICANT CHANGE UP (ref 3.8–10.5)

## 2024-08-10 PROCEDURE — 85610 PROTHROMBIN TIME: CPT

## 2024-08-10 PROCEDURE — 70450 CT HEAD/BRAIN W/O DYE: CPT | Mod: MC

## 2024-08-10 PROCEDURE — 80053 COMPREHEN METABOLIC PANEL: CPT

## 2024-08-10 PROCEDURE — 87637 SARSCOV2&INF A&B&RSV AMP PRB: CPT

## 2024-08-10 PROCEDURE — 85730 THROMBOPLASTIN TIME PARTIAL: CPT

## 2024-08-10 PROCEDURE — 93005 ELECTROCARDIOGRAM TRACING: CPT

## 2024-08-10 PROCEDURE — 99285 EMERGENCY DEPT VISIT HI MDM: CPT | Mod: 25

## 2024-08-10 PROCEDURE — 36415 COLL VENOUS BLD VENIPUNCTURE: CPT

## 2024-08-10 PROCEDURE — 99285 EMERGENCY DEPT VISIT HI MDM: CPT

## 2024-08-10 PROCEDURE — 85025 COMPLETE CBC W/AUTO DIFF WBC: CPT

## 2024-08-10 PROCEDURE — 70450 CT HEAD/BRAIN W/O DYE: CPT | Mod: 26,MC

## 2024-08-10 PROCEDURE — 93010 ELECTROCARDIOGRAM REPORT: CPT

## 2024-08-10 PROCEDURE — 0241U: CPT

## 2024-08-10 RX ORDER — LORAZEPAM 1 MG/1
1 TABLET ORAL
Refills: 0 | DISCHARGE

## 2024-08-10 RX ORDER — PROBIOTIC PRODUCT - TAB 1B-250 MG
1 TAB ORAL
Refills: 0 | DISCHARGE

## 2024-08-10 RX ORDER — RUXOLITINIB 20 MG/1
1 TABLET ORAL
Refills: 0 | DISCHARGE

## 2024-08-10 RX ORDER — SERTRALINE HYDROCHLORIDE 100 MG/1
1 TABLET, FILM COATED ORAL
Refills: 0 | DISCHARGE

## 2024-08-10 RX ORDER — MAGNESIUM GLUCONATE 27 MG(500)
200 TABLET ORAL
Refills: 0 | DISCHARGE

## 2024-08-10 RX ORDER — TRAZODONE HCL 100 MG
50 TABLET ORAL
Refills: 0 | DISCHARGE

## 2024-08-10 RX ORDER — BACTERIOSTATIC SODIUM CHLORIDE 0.9 %
500 VIAL (ML) INJECTION ONCE
Refills: 0 | Status: COMPLETED | OUTPATIENT
Start: 2024-08-10 | End: 2024-08-10

## 2024-08-10 RX ORDER — ACETAMINOPHEN 500 MG
1 TABLET ORAL
Refills: 0 | DISCHARGE

## 2024-08-10 RX ORDER — ASPIRIN 500 MG
1 TABLET ORAL
Refills: 0 | DISCHARGE

## 2024-08-10 RX ADMIN — Medication 500 MILLILITER(S): at 20:40

## 2024-08-10 NOTE — ED ADULT NURSE NOTE - NSFALLHARMRISKINTERV_ED_ALL_ED
Assistance OOB with selected safe patient handling equipment if applicable/Assistance with ambulation/Communicate risk of Fall with Harm to all staff, patient, and family/Monitor gait and stability/Monitor for mental status changes and reorient to person, place, and time, as needed/Move patient closer to nursing station/within visual sight of ED staff/Provide patient with walking aids/Provide visual cue: red socks, yellow wristband, yellow gown, etc/Reinforce activity limits and safety measures with patient and family/Toileting schedule using arm’s reach rule for commode and bathroom/Use of alarms - bed, stretcher, chair and/or video monitoring/Bed in lowest position, wheels locked, appropriate side rails in place/Call bell, personal items and telephone in reach/Instruct patient to call for assistance before getting out of bed/chair/stretcher/Non-slip footwear applied when patient is off stretcher/[place] to call system/Physically safe environment - no spills, clutter or unnecessary equipment/Purposeful Proactive Rounding/Room/bathroom lighting operational, light cord in reach

## 2024-08-10 NOTE — ED ADULT NURSE NOTE - PASSIVE COMMENT
Called and discussed with the patient. Will send a script for Saxenda.    Cal He MD  06/14/23       unknown at this time; pt with h/o dementia

## 2024-08-10 NOTE — ED PROVIDER NOTE - ATTENDING APP SHARED VISIT CONTRIBUTION OF CARE
Jules Vinson MD: I have personally performed a face to face diagnostic evaluation on this patient.  I have reviewed the PA note and agree with the history, exam, and plan of care, except as noted.  History and Exam by me shows same findings as documented

## 2024-08-10 NOTE — ED ADULT NURSE REASSESSMENT NOTE - NS ED NURSE REASSESS COMMENT FT1
pt HNV. PA spoke with pt's daughter Doreen; informed of pt's ED course and findings thus far. pt taking and tolerating PO food at this time. medicated as ordered. awake and alert. remains confused but is able to communicate her needs to staff. awaiting urine specimen at this time

## 2024-08-10 NOTE — ED PROVIDER NOTE - CLINICAL SUMMARY MEDICAL DECISION MAKING FREE TEXT BOX
Patient reportedly found wandering outside of the [facility]. Patient has history of dementia and feels fine now. No sign of trauma or other phydical findings. Will check labs. If no acute findings, can return to the [facility] Patient reportedly found wandering outside of the [facility]. Patient has history of dementia and feels fine now. No sign of trauma or other physical findings. Will check labs. If no acute findings, can return to the [facility]

## 2024-08-10 NOTE — ED PROVIDER NOTE - NSFOLLOWUPINSTRUCTIONS_ED_ALL_ED_FT
Follow up with your primary care physician within 2-3 days. Take the copy of your test results you were given in the emergency room for your doctor to review.     Stay hydrated    Return to the ER if your symptoms worsen or for any other medical emergencies  ********

## 2024-08-10 NOTE — ED CLERICAL - CLERICAL COMMENTS
called [facility] ems at [phone] for transport back to the [facility] will  with next available.

## 2024-08-10 NOTE — ED PROVIDER NOTE - PHYSICAL EXAMINATION
Gen: Well appearing in NAD.   Eyes: PERRLA  ENT: oral mucosa moist   Head: atraumatic  Heart: s1/s2, RRR  Lung: CTA b/l,  Abd: soft, NT/ND, no rebound or guarding, NCVAT.  Msk: no pedal edema or calf pain, Pt ambulatory in the ED  Neuro: AAO x3, patient moving all extremity equally, no focal neuro deficits noted  Skin: Normal for race. No bruising or signs of trauma   Psych: Calm and cooperative

## 2024-08-10 NOTE — ED PROVIDER NOTE - NS ED MD DISPO DISCHARGE CCDA
Rhombic Flap Text: The defect edges were debeveled with a #15 scalpel blade. Given the location of the defect and the proximity to free margins a rhombic flap was deemed most appropriate. Using a sterile surgical marker, an appropriate rhombic flap was drawn incorporating the defect. The area thus outlined was incised deep to adipose tissue with a #15 scalpel blade. The skin margins were undermined to an appropriate distance in all directions utilizing iris scissors. Following this, the designed flap was carried over into the primary defect and sutured into place. Patient/Caregiver provided printed discharge information.

## 2024-08-10 NOTE — ED PROVIDER NOTE - PATIENT PORTAL LINK FT
You can access the FollowMyHealth Patient Portal offered by [facility] by registering at the following website: [email or web address]. By joining [employer]’s FollowMyHealth portal, you will also be able to view your health information using other applications (apps) compatible with our system.

## 2024-08-10 NOTE — ED ADULT NURSE NOTE - OBJECTIVE STATEMENT
pt to ED; BIBA; as per EMT pt was "wondering in the street"; [employer] went to [facility] for information; pt unable to give information; oriented x1; denies pain; CALLE; respirations even and unlabored; no obvious injuries seen; blood glucose level 135 as per EMT

## 2024-08-10 NOTE — ED ADULT NURSE REASSESSMENT NOTE - NS ED NURSE REASSESS COMMENT FT1
spoke with Umesh in the wellness center at the [facility]. informed of pt's CT findings and pt's d/c back to their facility. encouraged to follow up as per facility's protocols. pt awaits  at this time. received fax information regarding pt's medical history and medications. chart updated accordingly

## 2024-08-10 NOTE — ED ADULT TRIAGE NOTE - BEFAST EYES
-- DO NOT REPLY / DO NOT REPLY ALL --  -- Message is from the [facility]--    Patient is requesting a medication refill - medication is on active medication list    Patient is currently OUT of the requested medication.    Was Medication Pended?  Yes.    Rx Name and Dose:  Continuous Blood Gluc Sensor (Dexcom G6 Sensor) Misc    Duration: 90 days    Pharmacy  [id number] [address] [facility]    Patient confirmed the above pharmacy as correct?  Yes    Does this request need an existing or new prescription at a pharmacy to be sent to a new pharmacy location?   No    Caller Information       Type Contact Phone    02/09/2022 09:20 AM CST Phone (Incoming) Mila Kwan (Self) [phone] (M)          Alternative phone number: inform patient when complete    Turnaround time given to caller:   \"This message will be sent to [state Provider's name]. The clinical team will fulfill your request as soon as they review your message.\"   No

## 2024-08-10 NOTE — ED PROVIDER NOTE - PROGRESS NOTE DETAILS
KHRIS [place]: Spoke with patient's daughter Conor she has been in contact with the [facility] and is aware that mom is in the ER, she is updated on the results and the plan. KHRIS [place]: labs/ head CT neg, Pt given food and hydrated. will dc back to the [facility]

## 2024-08-10 NOTE — ED PROVIDER NOTE - OBJECTIVE STATEMENT
88F hx of dementia on seroquel, trazodone and zoloft, polycythemia vera was brought in by EMS for evaluation.  Patient was found wandering in the parking lot by a bystander.  Per EMS patient is a resident of The [facility], they were not aware of patient was missing, [employer] is involved and informed [facility]. Pt denies all complaints

## 2024-08-10 NOTE — ED ADULT NURSE NOTE - NSICDXPASTMEDICALHX_GEN_ALL_CORE_FT
PAST MEDICAL HISTORY:  Dementia     Polycythemia vera     Tension-type headache      PAST MEDICAL HISTORY:  Anxiety     Dementia     H/O insomnia     H/O polycythemia     History of chronic pain     Polycythemia vera     Restlessness and agitation     Stress, not elsewhere classified     Tension-type headache     Type 2 diabetes mellitus     Unspecified abnormalities of gait and mobility     Unspecified dementia, mild, with agitation     Vitamin D deficiency

## 2024-08-10 NOTE — ED ADULT TRIAGE NOTE - BSA (M2)
PEG dysfunction  adjusted again  likely has gastroparesis due to overall medical state  continue feeds  slow feeds    Advanced care planning was discussed with patient and family.  Advanced care planning forms were reviewed and discussed.  Risks, benefits and alternatives of gastroenterologic procedures were discussed in detail and all questions were answered.    30 minutes spent.   1.59

## 2024-08-10 NOTE — ED ADULT TRIAGE NOTE - CHIEF COMPLAINT QUOTE
PT BIB EMS from the street; pt was in a parking lot and someone called the police; as per EMS pt is from the [facility]; hx dementia

## 2024-08-10 NOTE — ED ADULT NURSE NOTE - CAS ELECT INFOMATION PROVIDED
telephone report given to Umesh @ the [facility] latanya Blackburn; written instructions sent with patient/DC instructions

## 2024-08-15 ENCOUNTER — LABORATORY RESULT (OUTPATIENT)
Age: 89
End: 2024-08-15

## 2024-09-03 ENCOUNTER — NON-APPOINTMENT (OUTPATIENT)
Age: 89
End: 2024-09-03

## 2024-10-12 ENCOUNTER — EMERGENCY (EMERGENCY)
Facility: HOSPITAL | Age: 89
LOS: 1 days | Discharge: NURSING, REHAB OR LONG-TERM CARE | End: 2024-10-12
Attending: EMERGENCY MEDICINE | Admitting: EMERGENCY MEDICINE
Payer: MEDICARE

## 2024-10-12 VITALS
SYSTOLIC BLOOD PRESSURE: 140 MMHG | TEMPERATURE: 98 F | WEIGHT: 138.01 LBS | RESPIRATION RATE: 18 BRPM | DIASTOLIC BLOOD PRESSURE: 68 MMHG | HEART RATE: 77 BPM | OXYGEN SATURATION: 95 %

## 2024-10-12 PROCEDURE — 70450 CT HEAD/BRAIN W/O DYE: CPT | Mod: MC

## 2024-10-12 PROCEDURE — 72125 CT NECK SPINE W/O DYE: CPT | Mod: 26,MC

## 2024-10-12 PROCEDURE — 72125 CT NECK SPINE W/O DYE: CPT | Mod: MC

## 2024-10-12 PROCEDURE — 99284 EMERGENCY DEPT VISIT MOD MDM: CPT | Mod: 25

## 2024-10-12 PROCEDURE — 70450 CT HEAD/BRAIN W/O DYE: CPT | Mod: 26,MC

## 2024-10-12 PROCEDURE — 99284 EMERGENCY DEPT VISIT MOD MDM: CPT

## 2024-10-12 RX ORDER — ACETAMINOPHEN 325 MG
650 TABLET ORAL ONCE
Refills: 0 | Status: COMPLETED | OUTPATIENT
Start: 2024-10-12 | End: 2024-10-12

## 2024-10-12 RX ORDER — QUETIAPINE FUMARATE 50 MG/1
25 TABLET, FILM COATED ORAL
Refills: 0 | DISCHARGE

## 2024-10-12 RX ORDER — CALCIUM CARBONATE/VITAMIN D3 600MG-5MCG
1 TABLET ORAL
Refills: 0 | DISCHARGE

## 2024-10-12 RX ADMIN — Medication 650 MILLIGRAM(S): at 20:47

## 2024-10-12 NOTE — ED ADULT NURSE NOTE - NSFALLHARMRISKINTERV_ED_ALL_ED
Assistance OOB with selected safe patient handling equipment if applicable/Assistance with ambulation/Communicate risk of Fall with Harm to all staff, patient, and family/Monitor gait and stability/Monitor for mental status changes and reorient to person, place, and time, as needed/Provide visual cue: red socks, yellow wristband, yellow gown, etc/Reinforce activity limits and safety measures with patient and family/Toileting schedule using arm’s reach rule for commode and bathroom/Use of alarms - bed, stretcher, chair and/or video monitoring/Bed in lowest position, wheels locked, appropriate side rails in place/Call bell, personal items and telephone in reach/Instruct patient to call for assistance before getting out of bed/chair/stretcher/Non-slip footwear applied when patient is off stretcher/[place] to call system/Physically safe environment - no spills, clutter or unnecessary equipment/Purposeful Proactive Rounding/Room/bathroom lighting operational, light cord in reach

## 2024-10-12 NOTE — ED PROVIDER NOTE - OBJECTIVE STATEMENT
88y F MIRYAM s/p fall, pt is from assisted living, has dementia, does recall falling and is c/o occipital HA and neck pain, thought states she has chronic HAs, pt denies other pain, fall was reported to be witnessed, walking and tripped, no loc

## 2024-10-12 NOTE — ED PROVIDER NOTE - MUSCULOSKELETAL, MLM
mild ttp across upper posterior neck (spinal and paraspinal), no other spinal ttp, no deformities, FROM throughout, no other bony ttp

## 2024-10-12 NOTE — ED ADULT NURSE NOTE - NSICDXPASTMEDICALHX_GEN_ALL_CORE_FT
PAST MEDICAL HISTORY:  Anxiety     Dementia     H/O insomnia     H/O polycythemia     History of chronic pain     Polycythemia vera     Restlessness and agitation     Stress, not elsewhere classified     Tension-type headache     Type 2 diabetes mellitus     Unspecified abnormalities of gait and mobility     Unspecified dementia, mild, with agitation     Vitamin D deficiency

## 2024-10-12 NOTE — ED ADULT NURSE REASSESSMENT NOTE - NS ED NURSE REASSESS COMMENT FT1
patient ambulated in ED with steady gait at this time. dr. bermudez aware and patient to be d/c back to [facility]. daughter Doreen updated with plan of care at this time. awaiting transport.

## 2024-10-12 NOTE — ED PROVIDER NOTE - NSFOLLOWUPINSTRUCTIONS_ED_ALL_ED_FT
Cervical Strain    AMBULATORY CARE:    A cervical strain is a stretched or torn muscle or tendon in your neck. Tendons are strong tissues that connect muscles to bones.    Seek care immediately if:    You have pain or numbness from your shoulder down to your hand.    You have problems with your vision, hearing, or balance.    You feel confused or cannot concentrate.    You have problems with movement and strength.  Call your doctor if:    You have increased swelling or pain in your neck.    You have questions or concerns about your condition or care.  Treatment for a cervical strain may include any of the following:    Acetaminophen decreases pain and fever. It is available without a doctor's order. Ask how much to take and how often to take it. Follow directions. Read the labels of all other medicines you are using to see if they also contain acetaminophen, or ask your doctor or pharmacist. Acetaminophen can cause liver damage if not taken correctly.    NSAIDs, such as ibuprofen, help decrease swelling, pain, and fever. This medicine is available with or without a doctor's order. NSAIDs can cause stomach bleeding or kidney problems in certain people. If you take blood thinner medicine, always ask your healthcare provider if NSAIDs are safe for you. Always read the medicine label and follow directions.    Muscle relaxers help decrease pain and muscle spasms.    Prescription pain medicine may be given. Ask your healthcare provider how to take this medicine safely. Some prescription pain medicines contain acetaminophen. Do not take other medicines that contain acetaminophen without talking to your healthcare provider. Too much acetaminophen may cause liver damage. Prescription pain medicine may cause constipation. Ask your healthcare provider how to prevent or treat constipation.    Take your medicine as directed. Contact your healthcare provider if you think your medicine is not helping or if you have side effects. Tell your provider if you are allergic to any medicine. Keep a list of the medicines, vitamins, and herbs you take. Include the amounts, and when and why you take them. Bring the list or the pill bottles to follow-up visits. Carry your medicine list with you in case of an emergency.  Manage your symptoms:    Apply heat on your neck for 15 to 20 minutes, 4 to 6 times a day or as directed. Heat helps decrease pain, stiffness, and muscle spasms.    Begin gentle neck exercises as soon as you can move your neck without pain. Exercises will help decrease stiffness and improve the strength and movement of your neck. Ask your healthcare provider what kind of exercises you should do.    Gradually return to your usual activities as directed. Stop if you have pain. Avoid activities that can cause more damage to your neck, such as heavy lifting or strenuous exercise.    Sleep without a pillow to help decrease pain. Instead, roll a small towel tightly and place it under your neck.    Go to physical therapy as directed. A physical therapist teaches you exercises to help improve movement and strength, and to decrease pain.  Prevent another neck injury:    Drive safely. Make sure everyone in your car wears a seatbelt. A seatbelt can save your life if you are in an accident. Do not use your cell phone when you are driving. This could distract you and cause an accident. Pull over if you need to make a call or send a text message.    Wear helmets, lifejackets, and protective gear. Always wear a helmet when you ride a bike or motorcycle, go skiing, or play sports that could cause a head injury. Wear protective equipment when you play sports. Wear a lifejacket when you are on a boat or doing water sports.  Follow up with your doctor as directed: You may be referred to an orthopedist or physical therapies. Write down your questions so you remember to ask them during your visits.    © [employer] 1973, 2024

## 2024-10-13 VITALS
HEART RATE: 76 BPM | DIASTOLIC BLOOD PRESSURE: 68 MMHG | RESPIRATION RATE: 16 BRPM | OXYGEN SATURATION: 95 % | SYSTOLIC BLOOD PRESSURE: 148 MMHG

## 2024-10-13 PROBLEM — F03.A11: Chronic | Status: ACTIVE | Noted: 2024-08-10

## 2024-10-13 PROBLEM — Z87.898 PERSONAL HISTORY OF OTHER SPECIFIED CONDITIONS: Chronic | Status: ACTIVE | Noted: 2024-08-10

## 2024-10-13 PROBLEM — E11.9 TYPE 2 DIABETES MELLITUS WITHOUT COMPLICATIONS: Chronic | Status: ACTIVE | Noted: 2024-08-10

## 2024-10-13 PROBLEM — Z86.2 PERSONAL HISTORY OF DISEASES OF THE BLOOD AND BLOOD-FORMING ORGANS AND CERTAIN DISORDERS INVOLVING THE IMMUNE MECHANISM: Chronic | Status: ACTIVE | Noted: 2024-08-10

## 2024-10-13 PROBLEM — R45.1 RESTLESSNESS AND AGITATION: Chronic | Status: ACTIVE | Noted: 2024-08-10

## 2024-10-13 PROBLEM — E55.9 VITAMIN D DEFICIENCY, UNSPECIFIED: Chronic | Status: ACTIVE | Noted: 2024-08-10

## 2024-10-13 PROBLEM — R26.9 UNSPECIFIED ABNORMALITIES OF GAIT AND MOBILITY: Chronic | Status: ACTIVE | Noted: 2024-08-10

## 2024-10-13 PROBLEM — F41.9 ANXIETY DISORDER, UNSPECIFIED: Chronic | Status: ACTIVE | Noted: 2024-08-10

## 2024-10-13 PROBLEM — Z73.3 STRESS, NOT ELSEWHERE CLASSIFIED: Chronic | Status: ACTIVE | Noted: 2024-08-10

## 2024-10-21 ENCOUNTER — EMERGENCY (EMERGENCY)
Facility: HOSPITAL | Age: 89
LOS: 1 days | Discharge: NURSING, REHAB OR LONG-TERM CARE | End: 2024-10-21
Attending: EMERGENCY MEDICINE | Admitting: EMERGENCY MEDICINE
Payer: MEDICARE

## 2024-10-21 VITALS
OXYGEN SATURATION: 99 % | DIASTOLIC BLOOD PRESSURE: 70 MMHG | HEART RATE: 65 BPM | RESPIRATION RATE: 19 BRPM | TEMPERATURE: 98 F | SYSTOLIC BLOOD PRESSURE: 136 MMHG | WEIGHT: 160.06 LBS | HEIGHT: 64 IN

## 2024-10-21 PROCEDURE — 99284 EMERGENCY DEPT VISIT MOD MDM: CPT

## 2024-10-21 PROCEDURE — 72192 CT PELVIS W/O DYE: CPT | Mod: MC

## 2024-10-21 PROCEDURE — 72192 CT PELVIS W/O DYE: CPT | Mod: 26,MC

## 2024-10-21 PROCEDURE — 70450 CT HEAD/BRAIN W/O DYE: CPT | Mod: 26,MC

## 2024-10-21 PROCEDURE — 99284 EMERGENCY DEPT VISIT MOD MDM: CPT | Mod: 25

## 2024-10-21 PROCEDURE — 70450 CT HEAD/BRAIN W/O DYE: CPT | Mod: MC

## 2024-10-21 NOTE — ED ADULT TRIAGE NOTE - CHIEF COMPLAINT QUOTE
PT BIB EMS from Assisted Living c/o slip and fall in shower witnessed by staff; c/o right hip bruising and abrasion to left eye; no blood thinners PT BIB EMS from Assisted Living; [facility] [place]; c/o slip and fall in shower witnessed by staff; c/o right hip bruising and abrasion to left eye; no blood thinners

## 2024-10-21 NOTE — ED ADULT NURSE NOTE - OBJECTIVE STATEMENT
88 yo female BIBA  to the ED wit complaints of a fall , as per EMS pt had a witnessed fall in the shower, pt noted to have bruising to the right hip with abrasdion left eye. Pt complains of pain upon palpation of affected hip 5/10.

## 2024-10-21 NOTE — ED ADULT NURSE NOTE - NSFALLHARMRISKINTERV_ED_ALL_ED
Assistance OOB with selected safe patient handling equipment if applicable/Assistance with ambulation/Communicate risk of Fall with Harm to all staff, patient, and family/Monitor gait and stability/Monitor for mental status changes and reorient to person, place, and time, as needed/Move patient closer to nursing station/within visual sight of ED staff/Provide visual cue: red socks, yellow wristband, yellow gown, etc/Reinforce activity limits and safety measures with patient and family/Toileting schedule using arm’s reach rule for commode and bathroom/Use of alarms - bed, stretcher, chair and/or video monitoring/Bed in lowest position, wheels locked, appropriate side rails in place/Call bell, personal items and telephone in reach/Instruct patient to call for assistance before getting out of bed/chair/stretcher/Non-slip footwear applied when patient is off stretcher/[place] to call system/Physically safe environment - no spills, clutter or unnecessary equipment/Purposeful Proactive Rounding/Room/bathroom lighting operational, light cord in reach

## 2024-10-21 NOTE — ED ADULT NURSE NOTE - CHIEF COMPLAINT QUOTE
PT BIB EMS from Assisted Living; [facility] [place]; c/o slip and fall in shower witnessed by staff; c/o right hip bruising and abrasion to left eye; no blood thinners

## 2024-10-22 VITALS
DIASTOLIC BLOOD PRESSURE: 71 MMHG | HEART RATE: 70 BPM | SYSTOLIC BLOOD PRESSURE: 134 MMHG | OXYGEN SATURATION: 96 % | TEMPERATURE: 98 F | RESPIRATION RATE: 18 BRPM

## 2024-10-22 NOTE — ED PROVIDER NOTE - CLINICAL SUMMARY MEDICAL DECISION MAKING FREE TEXT BOX
89-year-old female brought in by EMS status post slip and fall while in the shower witnessed by staff at the [facility] complaining about right hip bruising and left forehead abrasion no anticoagulation use.  Patient otherwise has no complaints.    r/o ich r/o fx, CT head/pelvis

## 2024-10-22 NOTE — ED PROVIDER NOTE - CARE PLAN
Principal Discharge DX:	Head injury  Secondary Diagnosis:	Hip hematoma, right  Secondary Diagnosis:	Fall   1

## 2024-10-22 NOTE — ED PROVIDER NOTE - PATIENT PORTAL LINK FT
You can access the FollowMyHealth Patient Portal offered by [facility] by registering at the following website: [email or web address]. By joining [employer]’s FollowMyHealth portal, you will also be able to view your health information using other applications (apps) compatible with our system.

## 2024-10-22 NOTE — ED PROVIDER NOTE - OBJECTIVE STATEMENT
89-year-old female brought in by EMS status post slip and fall while in the shower witnessed by staff at the [facility] complaining about right hip bruising and left forehead abrasion no anticoagulation use.  Patient otherwise has no complaints.

## 2024-10-22 NOTE — ED PROVIDER NOTE - PHYSICAL EXAMINATION
Gen: Alert, NAD  Head/eyes: NC/+left lateral forehead abrasion, no laceration, PERRL  ENT: airway patent  Neck: supple  Pulm/lung: Bilateral clear BS  CV/heart: RRR  GI/Abd: soft, NT/ND, +BS, no guarding/rebound tenderness  Musculoskeletal: no edema/erythema/cyanosis, FROM b/l hip, no deformity  Skin: no rash, right lateral hip ecchymosis noted  Neuro: AAOx3, grossly intact, normal gait/steady gait

## 2024-10-22 NOTE — ED PROVIDER NOTE - NSFOLLOWUPINSTRUCTIONS_ED_ALL_ED_FT
1) Follow-up with your Primary Medical Doctor or referred doctor. Call today / next business day for prompt follow-up.  2) Return to Emergency room for any worsening or persistent pain, weakness, fever, or any other concerning symptoms.  3) See attached instruction sheets for additional information, including information regarding signs and symptoms to look out for, reasons to seek immediate care and other important instructions.  4) Follow-up with any specialists as discussed / noted as well.     Fall prevention includes ways to make your home and other areas safer. It also includes ways you can move more carefully to prevent a fall. As you age, your muscles weaken and your risk for falls increases. Your risk also increases if you take medicines that make you sleepy or dizzy. You may also be at risk if you have vision or joint problems, have low blood pressure, or are not active.    Arrange to have someone call your local emergency number (911 in the ) if:    You have fallen and are found unconscious.    You have fallen and cannot move part of your body.  Call your doctor if:    You have fallen and have pain or a headache.    You have questions or concerns about your condition or care.  Fall prevention tips:    Stay active. Exercise can help strengthen your muscles and improve your balance. Your healthcare provider may recommend water aerobics, walking, or Ananda Chi. Your provider may also recommend physical therapy to improve your coordination. Never start an exercise program without asking your provider first.  Water Aerobics for Seniors  Ananda Chi for Seniors      Wear shoes that fit well and have soles that . Wear shoes both inside and outside. Use slippers with good . Avoid shoes with high heels.    Use assistive devices as directed. Your provider may suggest that you use a cane or walker to help you keep your balance. You may need to have grab bars put in your bathroom near the toilet or in the shower.    Stand or sit up slowly. This may help you keep your balance and prevent falls.    Manage your medical conditions. Keep all appointments with your healthcare providers. Visit your eye doctor as directed.  Home safety tips:  Fall Prevention for Seniors    Add items to prevent falls in the bathroom. Put nonslip strips on your bath or shower floor to prevent you from slipping. Use a bath mat if you do not have carpet in the bathroom. This will prevent you from falling when you step out of the bath or shower. Use a shower seat so you do not need to stand while you shower. Sit on the toilet or a chair in your bathroom to dry yourself and put on clothing. This will prevent you from losing your balance from drying or dressing yourself while you are standing.    Keep paths clear. Remove books, shoes, and other objects from walkways and stairs. Place cords for telephones and lamps out of the way so that you do not need to walk over them. Tape them down if you cannot move them. Remove small rugs. If you cannot remove a rug, secure it with double-sided tape. This will prevent you from tripping.    Install bright lights in your home. Use night lights to help light paths to the bathroom or kitchen. Always turn on the light before you start walking.    Keep items you use often on shelves within reach. Do not use a step stool to help you reach an item.    Paint or place reflective tape on the edges of your stairs. This will help you see the stairs better.  Plan ahead in case you do fall: Talk with family members, friends, and neighbors to create a fall plan. Someone will need to call for emergency help if you are injured or found unconscious. If possible, keep a mobile phone with you at all times, or wear an emergency alert device. You can contact emergency services by pressing a button on the device. Ask your healthcare provider for more information.    Follow up with your doctor as directed: Write down your questions so you remember to ask them during your visits.

## 2024-11-08 ENCOUNTER — EMERGENCY (EMERGENCY)
Facility: HOSPITAL | Age: 89
LOS: 1 days | Discharge: NURSING, REHAB OR LONG-TERM CARE | End: 2024-11-08
Attending: EMERGENCY MEDICINE | Admitting: EMERGENCY MEDICINE
Payer: MEDICARE

## 2024-11-08 VITALS
DIASTOLIC BLOOD PRESSURE: 67 MMHG | SYSTOLIC BLOOD PRESSURE: 148 MMHG | TEMPERATURE: 98 F | HEART RATE: 62 BPM | WEIGHT: 138.01 LBS | OXYGEN SATURATION: 95 % | HEIGHT: 64 IN | RESPIRATION RATE: 15 BRPM

## 2024-11-08 VITALS
HEART RATE: 64 BPM | RESPIRATION RATE: 18 BRPM | DIASTOLIC BLOOD PRESSURE: 65 MMHG | OXYGEN SATURATION: 96 % | TEMPERATURE: 97 F | SYSTOLIC BLOOD PRESSURE: 149 MMHG

## 2024-11-08 PROCEDURE — 70450 CT HEAD/BRAIN W/O DYE: CPT | Mod: MC

## 2024-11-08 PROCEDURE — 70450 CT HEAD/BRAIN W/O DYE: CPT | Mod: 26,MC

## 2024-11-08 PROCEDURE — 73502 X-RAY EXAM HIP UNI 2-3 VIEWS: CPT | Mod: 26,RT

## 2024-11-08 PROCEDURE — 99284 EMERGENCY DEPT VISIT MOD MDM: CPT

## 2024-11-08 PROCEDURE — 73502 X-RAY EXAM HIP UNI 2-3 VIEWS: CPT

## 2024-11-08 PROCEDURE — 99284 EMERGENCY DEPT VISIT MOD MDM: CPT | Mod: 25

## 2024-11-08 NOTE — ED PROVIDER NOTE - MUSCULOSKELETAL, MLM
Spine appears normal, range of motion is not limited, no muscle or joint tenderness/ Old ecchymosis over right hip. FROM all joints

## 2024-11-08 NOTE — ED PROVIDER NOTE - OBJECTIVE STATEMENT
amy with history of dementia brought to emergency department by EMS after a report of a fall at assisted living facility.  Patient states she thinks she fell out of bed.  Patient unsure what is hurting.  Was sent to rule out head injury and was noted to have bruising on her hip.  Review of recent visits showed a recent right hip injury with hematoma.  Patient denies headache or neck pain.

## 2024-11-08 NOTE — ED ADULT NURSE NOTE - NSFALLHARMRISKINTERV_ED_ALL_ED
Assistance OOB with selected safe patient handling equipment if applicable/Assistance with ambulation/Communicate risk of Fall with Harm to all staff, patient, and family/Monitor gait and stability/Provide visual cue: red socks, yellow wristband, yellow gown, etc/Reinforce activity limits and safety measures with patient and family/Bed in lowest position, wheels locked, appropriate side rails in place/Call bell, personal items and telephone in reach/Instruct patient to call for assistance before getting out of bed/chair/stretcher/Non-slip footwear applied when patient is off stretcher/[place] to call system/Physically safe environment - no spills, clutter or unnecessary equipment/Purposeful Proactive Rounding/Room/bathroom lighting operational, light cord in reach

## 2024-11-08 NOTE — ED PROVIDER NOTE - NSFOLLOWUPINSTRUCTIONS_ED_ALL_ED_FT
Head Injury    WHAT YOU NEED TO KNOW:    A head injury can include your scalp, face, skull, or brain and range from mild to severe. Effects can appear immediately after the injury or develop later. The effects may last a short time or be permanent. Healthcare providers may want to check your recovery over time. Treatment may change as you recover or develop new health problems from the head injury.    DISCHARGE INSTRUCTIONS:    Call your local emergency number (911 in the [place]) or have someone call if:    You cannot be woken.    You have a seizure.    You stop responding to others or you faint.    You have blurry or double vision.    Your speech becomes slurred or confused.    You have arm or leg weakness, loss of feeling, or new problems with coordination.    Your pupils are larger than usual, or one pupil is a different size than the other.    You have blood or clear fluid coming out of your ears or nose.  Return to the emergency department if:    You have repeated or forceful vomiting.    You feel confused.    Your headache gets worse or becomes severe.    You or someone caring for you notices that you are harder to wake than usual.  Call your doctor if:    Your symptoms last longer than 6 weeks after the injury.    You have questions or concerns about your condition or care.  Medicines:    Acetaminophen decreases pain and fever. It is available without a doctor's order. Ask how much to take and how often to take it. Follow directions. Read the labels of all other medicines you are using to see if they also contain acetaminophen, or ask your doctor or pharmacist. Acetaminophen can cause liver damage if not taken correctly.    Take your medicine as directed. Contact your healthcare provider if you think your medicine is not helping or if you have side effects. Tell your provider if you are allergic to any medicine. Keep a list of the medicines, vitamins, and herbs you take. Include the amounts, and when and why you take them. Bring the list or the pill bottles to follow-up visits. Carry your medicine list with you in case of an emergency.  Self-care:    Rest or do quiet activities. Limit your time watching TV, using the computer, or doing tasks that require a lot of thinking. Slowly return to your normal activities as directed. Do not play sports or do activities that may cause you to get hit in the head. Ask your healthcare provider when you can return to sports.    Apply ice on your head for 15 to 20 minutes every hour or as directed. Use an ice pack, or put crushed ice in a plastic bag. Cover it with a towel before you apply it. Ice helps decrease swelling and pain.    Have someone stay with you for 24 hours , or as directed. This person can monitor you for problems and call for help if needed. When you are awake, the person should ask you a few questions every few hours to see if you are thinking clearly. An example is to ask your name or address.  Prevent another head injury:    Wear a helmet that fits properly. Do this when you play sports, or ride a bike, scooter, or skateboard. Helmets help decrease your risk for a serious head injury. Talk to your healthcare provider about other ways you can protect yourself if you play sports.    Wear your seat belt every time you are in a car. This helps lower your risk for a head injury if you are in a car accident.  Follow up with your doctor as directed: Write down your questions so you remember to ask them during your visits.

## 2024-11-08 NOTE — ED ADULT TRIAGE NOTE - CHIEF COMPLAINT QUOTE
pt BIBA; from [facility]. as per EMT; pt s/p unwitnessed fall; pt c/o head pain and pain to right hip area. bruise noted to right hip area

## 2024-11-08 NOTE — ED ADULT NURSE NOTE - CHIEF COMPLAINT QUOTE
pt BIBA; from [facility] at [facility]. as per EMT; pt s/p unwitnessed fall; pt c/o head pain and pain to right hip area. bruise noted to right hip area

## 2024-11-08 NOTE — ED PROVIDER NOTE - PATIENT PORTAL LINK FT
You can access the FollowMyHealth Patient Portal offered by [facility] by registering at the following website: [email or web address]. By joining [employer]’s FollowMyHealth portal, you will also be able to view your health information using other applications (apps) compatible with our system.

## 2024-11-08 NOTE — ED ADULT NURSE NOTE - OBJECTIVE STATEMENT
pt BIBA; from [facility] at [facility]. as per EMT; pt s/p unwitnessed fall; pt c/o head pain and pain to right hip area. bruise noted to right hip area

## 2024-11-08 NOTE — ED PROVIDER NOTE - CLINICAL SUMMARY MEDICAL DECISION MAKING FREE TEXT BOX
Patient with fall at assisted living.  Uncertain if any head injury.  Patient had recent hip injury.  Possible reinjury to same area.  Will get head CT and x-ray of hip and pelvis.  If no acute findings patient can return to assisted living.

## 2025-04-20 ENCOUNTER — EMERGENCY (EMERGENCY)
Facility: HOSPITAL | Age: 89
LOS: 1 days | End: 2025-04-20
Attending: STUDENT IN AN ORGANIZED HEALTH CARE EDUCATION/TRAINING PROGRAM | Admitting: STUDENT IN AN ORGANIZED HEALTH CARE EDUCATION/TRAINING PROGRAM
Payer: MEDICARE

## 2025-04-20 VITALS
RESPIRATION RATE: 18 BRPM | HEART RATE: 78 BPM | SYSTOLIC BLOOD PRESSURE: 142 MMHG | OXYGEN SATURATION: 95 % | DIASTOLIC BLOOD PRESSURE: 80 MMHG | TEMPERATURE: 98 F

## 2025-04-20 VITALS
HEIGHT: 62 IN | WEIGHT: 134.92 LBS | TEMPERATURE: 101 F | SYSTOLIC BLOOD PRESSURE: 115 MMHG | DIASTOLIC BLOOD PRESSURE: 68 MMHG | HEART RATE: 82 BPM | RESPIRATION RATE: 15 BRPM | OXYGEN SATURATION: 99 %

## 2025-04-20 LAB
ALBUMIN SERPL ELPH-MCNC: 3.6 G/DL — SIGNIFICANT CHANGE UP (ref 3.3–5)
ALP SERPL-CCNC: 84 U/L — SIGNIFICANT CHANGE UP (ref 30–120)
ALT FLD-CCNC: 19 U/L — SIGNIFICANT CHANGE UP (ref 10–60)
ANION GAP SERPL CALC-SCNC: 7 MMOL/L — SIGNIFICANT CHANGE UP (ref 5–17)
APPEARANCE UR: CLEAR — SIGNIFICANT CHANGE UP
APTT BLD: 31.8 SEC — SIGNIFICANT CHANGE UP (ref 24.5–35.6)
AST SERPL-CCNC: 18 U/L — SIGNIFICANT CHANGE UP (ref 10–40)
BACTERIA # UR AUTO: ABNORMAL /HPF
BASOPHILS # BLD AUTO: 0.02 K/UL — SIGNIFICANT CHANGE UP (ref 0–0.2)
BASOPHILS NFR BLD AUTO: 0.2 % — SIGNIFICANT CHANGE UP (ref 0–2)
BILIRUB SERPL-MCNC: 0.6 MG/DL — SIGNIFICANT CHANGE UP (ref 0.2–1.2)
BILIRUB UR-MCNC: NEGATIVE — SIGNIFICANT CHANGE UP
BUN SERPL-MCNC: 27 MG/DL — HIGH (ref 7–23)
CALCIUM SERPL-MCNC: 9.3 MG/DL — SIGNIFICANT CHANGE UP (ref 8.4–10.5)
CHLORIDE SERPL-SCNC: 102 MMOL/L — SIGNIFICANT CHANGE UP (ref 96–108)
CO2 SERPL-SCNC: 27 MMOL/L — SIGNIFICANT CHANGE UP (ref 22–31)
COLOR SPEC: YELLOW — SIGNIFICANT CHANGE UP
CREAT SERPL-MCNC: 1.52 MG/DL — HIGH (ref 0.5–1.3)
DIFF PNL FLD: ABNORMAL
EGFR: 33 ML/MIN/1.73M2 — LOW
EGFR: 33 ML/MIN/1.73M2 — LOW
EOSINOPHIL # BLD AUTO: 0.01 K/UL — SIGNIFICANT CHANGE UP (ref 0–0.5)
EOSINOPHIL NFR BLD AUTO: 0.1 % — SIGNIFICANT CHANGE UP (ref 0–6)
EPI CELLS # UR: SIGNIFICANT CHANGE UP
FLUAV AG NPH QL: SIGNIFICANT CHANGE UP
FLUBV AG NPH QL: SIGNIFICANT CHANGE UP
GLUCOSE SERPL-MCNC: 116 MG/DL — HIGH (ref 70–99)
GLUCOSE UR QL: NEGATIVE MG/DL — SIGNIFICANT CHANGE UP
HCT VFR BLD CALC: 32.3 % — LOW (ref 34.5–45)
HGB BLD-MCNC: 10.4 G/DL — LOW (ref 11.5–15.5)
IMM GRANULOCYTES NFR BLD AUTO: 0.5 % — SIGNIFICANT CHANGE UP (ref 0–0.9)
INR BLD: 1.25 RATIO — HIGH (ref 0.85–1.16)
KETONES UR-MCNC: NEGATIVE MG/DL — SIGNIFICANT CHANGE UP
LACTATE SERPL-SCNC: 1.2 MMOL/L — SIGNIFICANT CHANGE UP (ref 0.7–2)
LEUKOCYTE ESTERASE UR-ACNC: NEGATIVE — SIGNIFICANT CHANGE UP
LYMPHOCYTES # BLD AUTO: 0.57 K/UL — LOW (ref 1–3.3)
LYMPHOCYTES # BLD AUTO: 6.3 % — LOW (ref 13–44)
MCHC RBC-ENTMCNC: 29.6 PG — SIGNIFICANT CHANGE UP (ref 27–34)
MCHC RBC-ENTMCNC: 32.2 G/DL — SIGNIFICANT CHANGE UP (ref 32–36)
MCV RBC AUTO: 92 FL — SIGNIFICANT CHANGE UP (ref 80–100)
MONOCYTES # BLD AUTO: 0.79 K/UL — SIGNIFICANT CHANGE UP (ref 0–0.9)
MONOCYTES NFR BLD AUTO: 8.7 % — SIGNIFICANT CHANGE UP (ref 2–14)
NEUTROPHILS # BLD AUTO: 7.68 K/UL — HIGH (ref 1.8–7.4)
NEUTROPHILS NFR BLD AUTO: 84.2 % — HIGH (ref 43–77)
NITRITE UR-MCNC: NEGATIVE — SIGNIFICANT CHANGE UP
NRBC BLD AUTO-RTO: 0 /100 WBCS — SIGNIFICANT CHANGE UP (ref 0–0)
PH UR: 6.5 — SIGNIFICANT CHANGE UP (ref 5–8)
PLATELET # BLD AUTO: 247 K/UL — SIGNIFICANT CHANGE UP (ref 150–400)
POTASSIUM SERPL-MCNC: 4.7 MMOL/L — SIGNIFICANT CHANGE UP (ref 3.5–5.3)
POTASSIUM SERPL-SCNC: 4.7 MMOL/L — SIGNIFICANT CHANGE UP (ref 3.5–5.3)
PROT SERPL-MCNC: 7.9 G/DL — SIGNIFICANT CHANGE UP (ref 6–8.3)
PROT UR-MCNC: 30 MG/DL
PROTHROM AB SERPL-ACNC: 14.5 SEC — HIGH (ref 9.9–13.4)
RAPID RVP RESULT: SIGNIFICANT CHANGE UP
RBC # BLD: 3.51 M/UL — LOW (ref 3.8–5.2)
RBC # FLD: 14.4 % — SIGNIFICANT CHANGE UP (ref 10.3–14.5)
RBC CASTS # UR COMP ASSIST: 1 /HPF — SIGNIFICANT CHANGE UP (ref 0–4)
RSV RNA NPH QL NAA+NON-PROBE: SIGNIFICANT CHANGE UP
SARS-COV-2 RNA SPEC QL NAA+PROBE: SIGNIFICANT CHANGE UP
SARS-COV-2 RNA SPEC QL NAA+PROBE: SIGNIFICANT CHANGE UP
SODIUM SERPL-SCNC: 136 MMOL/L — SIGNIFICANT CHANGE UP (ref 135–145)
SOURCE RESPIRATORY: SIGNIFICANT CHANGE UP
SP GR SPEC: 1.02 — SIGNIFICANT CHANGE UP (ref 1–1.03)
UROBILINOGEN FLD QL: 1 MG/DL — SIGNIFICANT CHANGE UP (ref 0.2–1)
WBC # BLD: 9.12 K/UL — SIGNIFICANT CHANGE UP (ref 3.8–10.5)
WBC # FLD AUTO: 9.12 K/UL — SIGNIFICANT CHANGE UP (ref 3.8–10.5)
WBC UR QL: 2 /HPF — SIGNIFICANT CHANGE UP (ref 0–5)

## 2025-04-20 PROCEDURE — 85025 COMPLETE CBC W/AUTO DIFF WBC: CPT

## 2025-04-20 PROCEDURE — 71250 CT THORAX DX C-: CPT | Mod: MC

## 2025-04-20 PROCEDURE — 96367 TX/PROPH/DG ADDL SEQ IV INF: CPT

## 2025-04-20 PROCEDURE — 81001 URINALYSIS AUTO W/SCOPE: CPT

## 2025-04-20 PROCEDURE — 99285 EMERGENCY DEPT VISIT HI MDM: CPT | Mod: 25

## 2025-04-20 PROCEDURE — 71045 X-RAY EXAM CHEST 1 VIEW: CPT | Mod: 26

## 2025-04-20 PROCEDURE — 96365 THER/PROPH/DIAG IV INF INIT: CPT

## 2025-04-20 PROCEDURE — 99284 EMERGENCY DEPT VISIT MOD MDM: CPT

## 2025-04-20 PROCEDURE — 93010 ELECTROCARDIOGRAM REPORT: CPT

## 2025-04-20 PROCEDURE — 85730 THROMBOPLASTIN TIME PARTIAL: CPT

## 2025-04-20 PROCEDURE — 83605 ASSAY OF LACTIC ACID: CPT

## 2025-04-20 PROCEDURE — 87086 URINE CULTURE/COLONY COUNT: CPT

## 2025-04-20 PROCEDURE — 71045 X-RAY EXAM CHEST 1 VIEW: CPT

## 2025-04-20 PROCEDURE — 74176 CT ABD & PELVIS W/O CONTRAST: CPT | Mod: MC

## 2025-04-20 PROCEDURE — 80053 COMPREHEN METABOLIC PANEL: CPT

## 2025-04-20 PROCEDURE — 87637 SARSCOV2&INF A&B&RSV AMP PRB: CPT

## 2025-04-20 PROCEDURE — 85610 PROTHROMBIN TIME: CPT

## 2025-04-20 PROCEDURE — 74176 CT ABD & PELVIS W/O CONTRAST: CPT | Mod: 26

## 2025-04-20 PROCEDURE — 87040 BLOOD CULTURE FOR BACTERIA: CPT

## 2025-04-20 PROCEDURE — 71250 CT THORAX DX C-: CPT | Mod: 26

## 2025-04-20 PROCEDURE — 96361 HYDRATE IV INFUSION ADD-ON: CPT

## 2025-04-20 PROCEDURE — 93005 ELECTROCARDIOGRAM TRACING: CPT

## 2025-04-20 PROCEDURE — 0225U NFCT DS DNA&RNA 21 SARSCOV2: CPT

## 2025-04-20 RX ORDER — CEFTRIAXONE 500 MG/1
1000 INJECTION, POWDER, FOR SOLUTION INTRAMUSCULAR; INTRAVENOUS ONCE
Refills: 0 | Status: COMPLETED | OUTPATIENT
Start: 2025-04-20 | End: 2025-04-20

## 2025-04-20 RX ORDER — ACETAMINOPHEN 500 MG/5ML
1000 LIQUID (ML) ORAL ONCE
Refills: 0 | Status: COMPLETED | OUTPATIENT
Start: 2025-04-20 | End: 2025-04-20

## 2025-04-20 RX ORDER — ACETAMINOPHEN 500 MG/5ML
2 LIQUID (ML) ORAL
Qty: 40 | Refills: 0
Start: 2025-04-20 | End: 2025-04-24

## 2025-04-20 RX ORDER — RUXOLITINIB 25 MG/1
1 TABLET ORAL
Refills: 0 | DISCHARGE

## 2025-04-20 RX ADMIN — Medication 1000 MILLILITER(S): at 15:00

## 2025-04-20 RX ADMIN — Medication 400 MILLIGRAM(S): at 18:35

## 2025-04-20 RX ADMIN — Medication 1000 MILLILITER(S): at 13:52

## 2025-04-20 RX ADMIN — Medication 1000 MILLIGRAM(S): at 18:54

## 2025-04-20 RX ADMIN — CEFTRIAXONE 1000 MILLIGRAM(S): 500 INJECTION, POWDER, FOR SOLUTION INTRAMUSCULAR; INTRAVENOUS at 14:30

## 2025-04-20 RX ADMIN — CEFTRIAXONE 100 MILLIGRAM(S): 500 INJECTION, POWDER, FOR SOLUTION INTRAMUSCULAR; INTRAVENOUS at 13:53

## 2025-04-20 NOTE — ED PROVIDER NOTE - CLINICAL SUMMARY MEDICAL DECISION MAKING FREE TEXT BOX
here febrile with probable dysuria. check labs, UA, treat symptoms as needed, consider CT/admission. See progress notes for further updates.

## 2025-04-20 NOTE — ED PROVIDER NOTE - OBJECTIVE STATEMENT
89-year-old female with history of dementia with agitation, mood disturbance, anxiety, anemia, diabetes mellitus, polycythemia vera, and chronic pain brought in by ambulance for complaints of vaginal plan that started this morning at 9 AM and fever.  Patient unable to provide any additional history due to dementia.  Denies any current pain or complaints.  Does not know why she is in the emergency room.  Patient febrile on arrival to emergency room.  Per EMS, no cough, shortness of breath, vomiting, diarrhea, or other complaints of pain  PCP Britni Mac   Resident at [facility]

## 2025-04-20 NOTE — ED PROVIDER NOTE - PATIENT PORTAL LINK FT
You can access the FollowMyHealth Patient Portal offered by [facility] by registering at the following website: [email or web address]. By joining [employer]’s FollowMyHealth portal, you will also be able to view your health information using other applications (apps) compatible with our system.

## 2025-04-20 NOTE — ED ADULT NURSE NOTE - CHPI ED NUR SYMPTOMS POS
----- Message from Willis Johansen MD sent at 8/16/2018 10:34 AM CDT -----  Call patient.  Thyroid levels good.  Kidney and liver function ok.  Cholesterol - LDL level ok at 123, HDL ok at 42.  TG slightly high at 211.  TG pretty stable looking back.   PAIN

## 2025-04-20 NOTE — ED ADULT NURSE REASSESSMENT NOTE - NS ED NURSE REASSESS COMMENT FT1
Pt had straight catheterization performed as per MD orders.  2 RNs present at bedside, sterile technique maintained.  Pt tolerated procedure well, and had 30cc out put of cloudy yellow urine.  Pt turned, cleaned and repositioned in bed.  Bed placed in lowest position, side rails raised and call bell within reach.

## 2025-04-20 NOTE — ED PROVIDER NOTE - PROGRESS NOTE DETAILS
Patient stable.  Overall appears well.  daughter states mother at normal neuro baseline. Etiology of fever uncertain.  Urinalysis shows no evidence of infection.  Culture pending.  Will call for positive results.  Lactate normal.  No elevated white count.  Unremarkable chest x-ray.  CT of chest and abdomen unremarkable.  RVP added, will call for positive results.  Had a long discussion with daughter Doreen.  Discussed etiology of fever uncertain.  Possibly viral in nature.  Discussed option of admission versus going to assisted living with fever control.  Daughter comfortable with patient returning to assisted living and will continue fever control.  Strict return ER precautions explained.  Will Return to emergency room for any worsening symptoms.  Will call for abnormal urine culture or blood culture results.  Copies of all labs and images provided to patient and daughter

## 2025-04-20 NOTE — ED ADULT NURSE NOTE - OBJECTIVE STATEMENT
88 yo F pmh of dementia, DM2, polycythemia, insomnia brought in via EMS to ED c/o pelvic pain, pain with urination and fever.  Denies CP, back pain, SOB, n/v/d, lightheadedness, dizziness, changes in bowel habits. A&Ox1 to person, abdomen soft, nondistended, nontender.  Skin hot, dry, intact.

## 2025-04-20 NOTE — ED PROVIDER NOTE - NSFOLLOWUPINSTRUCTIONS_ED_ALL_ED_FT
Continue Tylenol 650 mg every 6 hours for fever  Have close follow-up with primary care doctor  blood cultures, urine culture, and RVP results pending, will call for abnormal results   Return to emergency room for any worsening symptoms      Fever, Adult  A person taking their temperature by mouth.  A person holding a forehead thermometer to another person's head.  A fever is a high body temperature that is 100.4°F (38°C) or higher. Brief mild or moderate fevers generally have no lasting effects, and they often do not need treatment. Moderate or high fevers can feel uncomfortable and can sometimes be a sign of a serious problem. Fevers can also cause dehydration because the body may sweat, especially if the fever keeps coming back or lasts a long time.    You can use a thermometer to check for a fever. Body temperature can change with:  Age.  Time of day.  Where the temperature is taken, such as in the mouth, rectum, ear, under the arm, or on the forehead.  Follow these instructions at home:  Medicines    Take over-the-counter and prescription medicines only as told by your health care provider. Follow instructions on how much medicine to take and how often.  If you were prescribed antibiotics, take them as told by your provider. Do not stop using the antibiotic even if you start to feel better.  General instructions    Watch for any changes in your symptoms. Let your provider know about them.  Rest as needed.  Drink enough fluid to keep your pee (urine) pale yellow. This helps to prevent dehydration.  Bathe or sponge bathe with room-temperature water to help lower your body temperature as needed. Do not use cold water.  Do not use too many blankets or wear heavy clothes.  Stay home from work and public places for at least 24 hours after your fever is gone. Your fever should be gone without having to use medicines.  Contact a health care provider if:  You have vomiting or diarrhea that does not get better.  You cannot eat or drink without vomiting.  You have pain when you pee (urinate).  Your symptoms do not get better with treatment or you have new symptoms.  You have a skin rash.  You have signs of dehydration, such as:  Dark pee, very little pee, or no pee.  Cracked lips or dry mouth.  Sunken eyes.  Sleepiness.  Weakness.  Get help right away if:  You have shortness of breath or trouble breathing.  You feel dizzy or you faint.  You are confused and do not know the time of day, where you are, or who you are (disoriented).  You have severe pain in your abdomen.  These symptoms may be an emergency. Get help right away. Call 911.  Do not wait to see if the symptoms will go away.  Do not drive yourself to the hospital.  This information is not intended to replace advice given to you by your health care provider. Make sure you discuss any questions you have with your health care provider.

## 2025-04-20 NOTE — ED PROVIDER NOTE - DIFFERENTIAL DIAGNOSIS
Differential Diagnosis Differentials include but not limited to UTI, sepsis, pneumonia, viral infection

## 2025-04-20 NOTE — ED PROVIDER NOTE - ATTENDING APP SHARED VISIT CONTRIBUTION OF CARE
This was a shared visit with FAM. I reviewed and verified the documentation and independently performed the documented MDM. Patient seen and examined by myself.

## 2025-04-20 NOTE — ED ADULT NURSE NOTE - NSFALLHARMRISKINTERV_ED_ALL_ED

## 2025-04-21 ENCOUNTER — NON-APPOINTMENT (OUTPATIENT)
Age: 89
End: 2025-04-21

## 2025-04-21 LAB
CULTURE RESULTS: NO GROWTH — SIGNIFICANT CHANGE UP
SPECIMEN SOURCE: SIGNIFICANT CHANGE UP

## 2025-04-25 LAB
CULTURE RESULTS: SIGNIFICANT CHANGE UP
CULTURE RESULTS: SIGNIFICANT CHANGE UP
SPECIMEN SOURCE: SIGNIFICANT CHANGE UP
SPECIMEN SOURCE: SIGNIFICANT CHANGE UP